# Patient Record
Sex: MALE | Race: WHITE | NOT HISPANIC OR LATINO | Employment: UNEMPLOYED | URBAN - METROPOLITAN AREA
[De-identification: names, ages, dates, MRNs, and addresses within clinical notes are randomized per-mention and may not be internally consistent; named-entity substitution may affect disease eponyms.]

---

## 2019-05-31 ENCOUNTER — APPOINTMENT (EMERGENCY)
Dept: RADIOLOGY | Facility: HOSPITAL | Age: 34
End: 2019-05-31
Attending: EMERGENCY MEDICINE
Payer: COMMERCIAL

## 2019-05-31 ENCOUNTER — HOSPITAL ENCOUNTER (EMERGENCY)
Facility: HOSPITAL | Age: 34
Discharge: HOME | End: 2019-05-31
Attending: EMERGENCY MEDICINE
Payer: COMMERCIAL

## 2019-05-31 VITALS
SYSTOLIC BLOOD PRESSURE: 152 MMHG | BODY MASS INDEX: 27.98 KG/M2 | HEART RATE: 98 BPM | DIASTOLIC BLOOD PRESSURE: 83 MMHG | WEIGHT: 225 LBS | RESPIRATION RATE: 18 BRPM | TEMPERATURE: 97.5 F | HEIGHT: 75 IN | OXYGEN SATURATION: 98 %

## 2019-05-31 DIAGNOSIS — K64.9 HEMORRHOIDS, UNSPECIFIED HEMORRHOID TYPE: ICD-10-CM

## 2019-05-31 DIAGNOSIS — K92.2 GASTROINTESTINAL HEMORRHAGE, UNSPECIFIED GASTROINTESTINAL HEMORRHAGE TYPE: ICD-10-CM

## 2019-05-31 DIAGNOSIS — R10.9 ABDOMINAL PAIN, UNSPECIFIED ABDOMINAL LOCATION: Primary | ICD-10-CM

## 2019-05-31 DIAGNOSIS — D64.9 ANEMIA, UNSPECIFIED TYPE: ICD-10-CM

## 2019-05-31 LAB
ALBUMIN SERPL-MCNC: 4.5 G/DL (ref 3.4–5)
ALP SERPL-CCNC: 54 IU/L (ref 35–126)
ALT SERPL-CCNC: 17 IU/L (ref 16–63)
ANION GAP SERPL CALC-SCNC: 9 MEQ/L (ref 3–15)
ANISOCYTOSIS BLD QL SMEAR: ABNORMAL
AST SERPL-CCNC: 16 IU/L (ref 15–41)
BASOPHILS # BLD: 0.06 K/UL (ref 0.01–0.1)
BASOPHILS NFR BLD: 1.1 %
BILIRUB SERPL-MCNC: 0.7 MG/DL (ref 0.3–1.2)
BUN SERPL-MCNC: 15 MG/DL (ref 8–20)
CALCIUM SERPL-MCNC: 9.1 MG/DL (ref 8.9–10.3)
CHLORIDE SERPL-SCNC: 108 MEQ/L (ref 98–109)
CO2 SERPL-SCNC: 22 MEQ/L (ref 22–32)
CREAT SERPL-MCNC: 1 MG/DL
DIFFERENTIAL METHOD BLD: ABNORMAL
EOSINOPHIL # BLD: 0.03 K/UL (ref 0.04–0.54)
EOSINOPHIL NFR BLD: 0.6 %
ERYTHROCYTE [DISTWIDTH] IN BLOOD BY AUTOMATED COUNT: 16.7 % (ref 11.6–14.4)
GFR SERPL CREATININE-BSD FRML MDRD: >60 ML/MIN/1.73M*2
GLUCOSE SERPL-MCNC: 101 MG/DL (ref 70–99)
HCT VFR BLDCO AUTO: 31.8 %
HGB BLD-MCNC: 9.1 G/DL
HYPOCHROMIA BLD QL SMEAR: ABNORMAL
IMM GRANULOCYTES # BLD AUTO: 0.01 K/UL (ref 0–0.08)
IMM GRANULOCYTES NFR BLD AUTO: 0.2 %
LIPASE SERPL-CCNC: 40 U/L (ref 20–51)
LYMPHOCYTES # BLD: 1.68 K/UL (ref 1.2–3.5)
LYMPHOCYTES NFR BLD: 31.1 %
MCH RBC QN AUTO: 19.7 PG (ref 28–33.2)
MCHC RBC AUTO-ENTMCNC: 28.6 G/DL (ref 32.2–36.5)
MCV RBC AUTO: 68.8 FL (ref 83–98)
MONOCYTES # BLD: 0.47 K/UL (ref 0.3–1)
MONOCYTES NFR BLD: 8.7 %
NEUTROPHILS # BLD: 3.15 K/UL (ref 1.7–7)
NEUTS SEG NFR BLD: 58.3 %
NRBC BLD-RTO: 0 %
OVALOCYTES BLD QL SMEAR: ABNORMAL
PDW BLD AUTO: 10.2 FL (ref 9.4–12.4)
PLATELET # BLD AUTO: 593 K/UL
PLATELET # BLD EST: ABNORMAL 10*3/UL
POTASSIUM SERPL-SCNC: 3.5 MEQ/L (ref 3.6–5.1)
PROT SERPL-MCNC: 7.8 G/DL (ref 6–8.2)
RBC # BLD AUTO: 4.62 M/UL (ref 4.5–5.8)
SODIUM SERPL-SCNC: 139 MEQ/L (ref 136–144)
TROPONIN I SERPL-MCNC: <0.02 NG/ML
WBC # BLD AUTO: 5.4 K/UL

## 2019-05-31 PROCEDURE — 93005 ELECTROCARDIOGRAM TRACING: CPT

## 2019-05-31 PROCEDURE — 80053 COMPREHEN METABOLIC PANEL: CPT | Performed by: EMERGENCY MEDICINE

## 2019-05-31 PROCEDURE — 99284 EMERGENCY DEPT VISIT MOD MDM: CPT | Mod: 25

## 2019-05-31 PROCEDURE — 93005 ELECTROCARDIOGRAM TRACING: CPT | Performed by: EMERGENCY MEDICINE

## 2019-05-31 PROCEDURE — 96360 HYDRATION IV INFUSION INIT: CPT | Mod: 59

## 2019-05-31 PROCEDURE — 84484 ASSAY OF TROPONIN QUANT: CPT | Performed by: EMERGENCY MEDICINE

## 2019-05-31 PROCEDURE — 63600105 HC IODINE BASED CONTRAST: Performed by: EMERGENCY MEDICINE

## 2019-05-31 PROCEDURE — 96361 HYDRATE IV INFUSION ADD-ON: CPT | Mod: 59

## 2019-05-31 PROCEDURE — 3E033GC INTRODUCTION OF OTHER THERAPEUTIC SUBSTANCE INTO PERIPHERAL VEIN, PERCUTANEOUS APPROACH: ICD-10-PCS | Performed by: EMERGENCY MEDICINE

## 2019-05-31 PROCEDURE — 36415 COLL VENOUS BLD VENIPUNCTURE: CPT

## 2019-05-31 PROCEDURE — 74177 CT ABD & PELVIS W/CONTRAST: CPT

## 2019-05-31 PROCEDURE — 85025 COMPLETE CBC W/AUTO DIFF WBC: CPT | Performed by: EMERGENCY MEDICINE

## 2019-05-31 PROCEDURE — 83690 ASSAY OF LIPASE: CPT | Performed by: EMERGENCY MEDICINE

## 2019-05-31 PROCEDURE — 25800000 HC PHARMACY IV SOLUTIONS: Performed by: EMERGENCY MEDICINE

## 2019-05-31 RX ORDER — FAMOTIDINE 40 MG/1
40 TABLET, FILM COATED ORAL 2 TIMES DAILY
Qty: 180 TABLET | Refills: 0 | Status: SHIPPED | OUTPATIENT
Start: 2019-05-31 | End: 2020-04-11 | Stop reason: HOSPADM

## 2019-05-31 RX ADMIN — IOHEXOL 125 ML: 300 INJECTION, SOLUTION INTRAVENOUS at 11:15

## 2019-05-31 RX ADMIN — SODIUM CHLORIDE 1000 ML: 9 INJECTION, SOLUTION INTRAVENOUS at 10:24

## 2019-05-31 ASSESSMENT — ENCOUNTER SYMPTOMS
BLOOD IN STOOL: 1
DIARRHEA: 1
FATIGUE: 1
VOMITING: 0
DYSURIA: 0
COLOR CHANGE: 0
ARTHRALGIAS: 0
SHORTNESS OF BREATH: 0
HEMATURIA: 0
PALPITATIONS: 0
FEVER: 0
CONSTIPATION: 1
SEIZURES: 0
EYE PAIN: 0
ABDOMINAL PAIN: 1
SORE THROAT: 0
BACK PAIN: 0
COUGH: 0
CHILLS: 0

## 2019-05-31 NOTE — DISCHARGE INSTRUCTIONS
You are being discharged from the ED. You will need to schedule an appointment with a gastroenterologist for further evaluation.

## 2019-05-31 NOTE — ED ATTESTATION NOTE
Procedures  Physical Exam  Review of Systems    5/31/201910:34 AM  I have personally seen and examined the patient.  I reviewed and agree with the PA/NP/Resident's assessment and plan of care.    My examination, assessment, and plan of care of En Gonsales is as follows:  The patient presents with fatigue, chest pain, abdominal pain and 50 pound weight loss over the last few months.  Exam: Left lower quadrant pain.  No guarding, rigidity or rebound  Impression/Plan:iv, Labs, CT, observe     I was physically present for the key/critical portions of the following procedures: None     Eligio Amaro MD  05/31/19 9492

## 2019-05-31 NOTE — ED PROVIDER NOTES
"HPI     Chief Complaint   Patient presents with   • Abdominal Pain       33yom PMHx depression, bipolar disorder  Patient presents with numerous complaints including fatigue, abdominal pain, sleep disturbance, variable bowel habits, occasional chest pain.  Symptoms have been present for 4-5 months. No recent change in severity of symptoms, however he was \"forced\" to come to ED by a family member.  No active complaints here in the ED other than fatigue. Also concerned about a recent 50 lb weight loss.               Patient History     History reviewed. No pertinent past medical history.    Past Surgical History:   Procedure Laterality Date   • ADENOIDECTOMY     • APPENDECTOMY     • FEM-POP BYPASS     • HERNIA REPAIR     • SHOULDER SURGERY     • TONSILLECTOMY         History reviewed. No pertinent family history.    Social History   Substance Use Topics   • Smoking status: Former Smoker   • Smokeless tobacco: Never Used   • Alcohol use No       Systems Reviewed from Nursing Triage:          Review of Systems     Review of Systems   Constitutional: Positive for fatigue. Negative for chills and fever.   HENT: Negative for ear pain and sore throat.    Eyes: Negative for pain and visual disturbance.   Respiratory: Negative for cough and shortness of breath.    Cardiovascular: Positive for chest pain. Negative for palpitations.   Gastrointestinal: Positive for abdominal pain, blood in stool, constipation and diarrhea. Negative for vomiting.   Genitourinary: Negative for dysuria and hematuria.   Musculoskeletal: Negative for arthralgias and back pain.   Skin: Negative for color change and rash.   Neurological: Negative for seizures and syncope.   All other systems reviewed and are negative.       Physical Exam     ED Triage Vitals [05/31/19 0955]   Temp Heart Rate Resp BP SpO2   36.8 °C (98.3 °F) (!) 107 18 (!) 143/90 97 %      Temp src Heart Rate Source Patient Position BP Location FiO2 (%) (Set)   -- -- -- -- --           " "          Patient Vitals for the past 24 hrs:   BP Temp Pulse Resp SpO2 Height Weight   05/31/19 1025 (!) 148/79 - (!) 101 18 98 % - -   05/31/19 0955 (!) 143/90 36.8 °C (98.3 °F) (!) 107 18 97 % 1.905 m (6' 3\") 102 kg (225 lb)           Physical Exam   Constitutional: He appears well-developed and well-nourished.   HENT:   Head: Normocephalic and atraumatic.   Eyes: Conjunctivae are normal.   Neck: Neck supple.   Cardiovascular: Normal rate and regular rhythm.    No murmur heard.  Pulmonary/Chest: Effort normal and breath sounds normal. No respiratory distress.   Abdominal: Soft. Normal appearance. There is no tenderness.   Genitourinary: Rectal exam shows external hemorrhoid and guaiac positive stool.   Musculoskeletal: He exhibits no edema.   Neurological: He is alert.   Skin: Skin is warm and dry.   Psychiatric: He has a normal mood and affect.   Nursing note and vitals reviewed.           Procedures    ED Course & MDM     Labs Reviewed   CBC - Abnormal        Result Value    WBC 5.40      RBC 4.62      Hemoglobin 9.1 (*)     Hematocrit 31.8 (*)     MCV 68.8 (*)     MCH 19.7 (*)     MCHC 28.6 (*)     RDW 16.7 (*)     Platelets 593 (*)     MPV 10.2     CBC AND DIFFERENTIAL    Narrative:     The following orders were created for panel order CBC and differential.  Procedure                               Abnormality         Status                     ---------                               -----------         ------                     CBC[[id number]]                           Abnormal            Final result               Diff Count[[id number]]                                        In process                   Please view results for these tests on the individual orders.   COMPREHENSIVE METABOLIC PANEL   LIPASE   URINALYSIS REFLEX CULTURE    Narrative:     The following orders were created for panel order Urinalysis with Reflex Culture.  Procedure                               Abnormality         Status                "      ---------                               -----------         ------                     UA Reflex to Culture (Mac...[[id number]]                                                   Please view results for these tests on the individual orders.   TROPONIN I   RAINBOW DRAW PANEL    Narrative:     The following orders were created for panel order [place] Draw Panel.  Procedure                               Abnormality         Status                     ---------                               -----------         ------                     RAINBOW RED[[id number]]                                       In process                 RAINBOW PINK[[id number]]                                                                 RAINBOW PINK[[id number]]                                      In process                 RAINBOW LT BLUE[[id number]]                                   In process                 RAINBOW GOLD[[id number]]                                      In process                   Please view results for these tests on the individual orders.   DIFF COUNT   UA REFLEX CULTURE (MACROSCOPIC)   RAINBOW RED   RAINBOW PINK   RAINBOW PINK   RAINBOW LT BLUE   RAINBOW GOLD       ECG 12 lead    (Results Pending)   CT ABDOMEN PELVIS WITH IV CONTRAST    (Results Pending)               MDM  Number of Diagnoses or Management Options  Abdominal pain, unspecified abdominal location:   Anemia, unspecified type:   Gastrointestinal hemorrhage, unspecified gastrointestinal hemorrhage type:   Hemorrhoids, unspecified hemorrhoid type:   Diagnosis management comments: 33yom with GI bleed, heme positive and anemia  No transfusion necessary  CT unremarkable  Dc for GI f/u             ED Course as of [place] 02 1906   Fri May 31, 2019   1056 Hemoglobin: (!) 9.1 [AB]   1232 The patient wants to go home.  We attempted to make an outpatient follow-up at the office is closed.  I will call the on-call GI doctor and have him have the office call the patient with the  time.  [SB]      ED Course User Index  [AB] Gavin Norton MD  [SB] Eligio Amaro MD         Clinical Impressions as of Jun 02 1906   Abdominal pain, unspecified abdominal location   Hemorrhoids, unspecified hemorrhoid type   Gastrointestinal hemorrhage, unspecified gastrointestinal hemorrhage type   Anemia, unspecified type        Gavin Norton MD  Resident  06/02/19 1908

## 2019-06-01 LAB
ATRIAL RATE: 101
P AXIS: 66
PR INTERVAL: 140
QRS DURATION: 94
QT INTERVAL: 346
QTC CALCULATION(BAZETT): 448
R AXIS: 79
T WAVE AXIS: 46
VENTRICULAR RATE: 101

## 2019-06-01 PROCEDURE — 93010 ELECTROCARDIOGRAM REPORT: CPT | Performed by: INTERNAL MEDICINE

## 2020-02-27 ENCOUNTER — APPOINTMENT (EMERGENCY)
Dept: RADIOLOGY | Facility: HOSPITAL | Age: 35
End: 2020-02-27
Attending: EMERGENCY MEDICINE
Payer: COMMERCIAL

## 2020-02-27 ENCOUNTER — HOSPITAL ENCOUNTER (EMERGENCY)
Facility: HOSPITAL | Age: 35
Discharge: HOME | End: 2020-02-27
Attending: EMERGENCY MEDICINE
Payer: COMMERCIAL

## 2020-02-27 VITALS
HEIGHT: 75 IN | DIASTOLIC BLOOD PRESSURE: 85 MMHG | HEART RATE: 81 BPM | BODY MASS INDEX: 27.98 KG/M2 | RESPIRATION RATE: 18 BRPM | TEMPERATURE: 98.2 F | WEIGHT: 225 LBS | OXYGEN SATURATION: 99 % | SYSTOLIC BLOOD PRESSURE: 140 MMHG

## 2020-02-27 DIAGNOSIS — A08.4 VIRAL GASTROENTERITIS: Primary | ICD-10-CM

## 2020-02-27 LAB
ALBUMIN SERPL-MCNC: 5.1 G/DL (ref 3.4–5)
ALP SERPL-CCNC: 39 IU/L (ref 35–126)
ALT SERPL-CCNC: 16 IU/L (ref 16–63)
ANION GAP SERPL CALC-SCNC: 11 MEQ/L (ref 3–15)
AST SERPL-CCNC: 21 IU/L (ref 15–41)
BACTERIA URNS QL MICRO: NORMAL /HPF
BASOPHILS # BLD: 0.06 K/UL (ref 0.01–0.1)
BASOPHILS NFR BLD: 0.7 %
BILIRUB SERPL-MCNC: 0.8 MG/DL (ref 0.3–1.2)
BILIRUB UR QL STRIP.AUTO: NEGATIVE MG/DL
BUN SERPL-MCNC: 16 MG/DL (ref 8–20)
CALCIUM SERPL-MCNC: 9.3 MG/DL (ref 8.9–10.3)
CHLORIDE SERPL-SCNC: 99 MEQ/L (ref 98–109)
CLARITY UR REFRACT.AUTO: CLEAR
CO2 SERPL-SCNC: 25 MEQ/L (ref 22–32)
COLOR UR AUTO: YELLOW
CREAT SERPL-MCNC: 1 MG/DL (ref 0.8–1.3)
DIFFERENTIAL METHOD BLD: ABNORMAL
EOSINOPHIL # BLD: 0.01 K/UL (ref 0.04–0.54)
EOSINOPHIL NFR BLD: 0.1 %
ERYTHROCYTE [DISTWIDTH] IN BLOOD BY AUTOMATED COUNT: 16 % (ref 11.6–14.4)
FLUAV RNA SPEC QL NAA+PROBE: NEGATIVE
FLUBV RNA SPEC QL NAA+PROBE: NEGATIVE
GFR SERPL CREATININE-BSD FRML MDRD: >60 ML/MIN/1.73M*2
GLUCOSE SERPL-MCNC: 117 MG/DL (ref 70–99)
GLUCOSE UR STRIP.AUTO-MCNC: NEGATIVE MG/DL
HCT VFR BLDCO AUTO: 39.2 % (ref 40.1–51)
HGB BLD-MCNC: 11.4 G/DL (ref 13.7–17.5)
HGB UR QL STRIP.AUTO: NEGATIVE
HYALINE CASTS #/AREA URNS LPF: NORMAL /LPF
IMM GRANULOCYTES # BLD AUTO: 0.03 K/UL (ref 0–0.08)
IMM GRANULOCYTES NFR BLD AUTO: 0.3 %
KETONES UR STRIP.AUTO-MCNC: 2 MG/DL
LEUKOCYTE ESTERASE UR QL STRIP.AUTO: NEGATIVE
LIPASE SERPL-CCNC: 40 U/L (ref 20–51)
LYMPHOCYTES # BLD: 1.18 K/UL (ref 1.2–3.5)
LYMPHOCYTES NFR BLD: 13 %
MCH RBC QN AUTO: 20.8 PG (ref 28–33.2)
MCHC RBC AUTO-ENTMCNC: 29.1 G/DL (ref 32.2–36.5)
MCV RBC AUTO: 71.4 FL (ref 83–98)
MONOCYTES # BLD: 0.48 K/UL (ref 0.3–1)
MONOCYTES NFR BLD: 5.3 %
NEUTROPHILS # BLD: 7.34 K/UL (ref 1.7–7)
NEUTS SEG NFR BLD: 80.6 %
NITRITE UR QL STRIP.AUTO: NEGATIVE
NRBC BLD-RTO: 0 %
PDW BLD AUTO: 10.3 FL (ref 9.4–12.4)
PH UR STRIP.AUTO: 6.5 [PH]
PLATELET # BLD AUTO: 514 K/UL (ref 150–350)
POTASSIUM SERPL-SCNC: 3.2 MEQ/L (ref 3.6–5.1)
PROT SERPL-MCNC: 8.6 G/DL (ref 6–8.2)
PROT UR QL STRIP.AUTO: ABNORMAL
RBC # BLD AUTO: 5.49 M/UL (ref 4.5–5.8)
RBC #/AREA URNS HPF: NORMAL /HPF
RSV RNA SPEC QL NAA+PROBE: NEGATIVE
SODIUM SERPL-SCNC: 135 MEQ/L (ref 136–144)
SP GR UR REFRACT.AUTO: >1.035
SQUAMOUS URNS QL MICRO: NORMAL /HPF
UROBILINOGEN UR STRIP-ACNC: 0.2 EU/DL
WBC # BLD AUTO: 9.1 K/UL (ref 3.8–10.5)
WBC #/AREA URNS HPF: NORMAL /HPF

## 2020-02-27 PROCEDURE — 3E013GC INTRODUCTION OF OTHER THERAPEUTIC SUBSTANCE INTO SUBCUTANEOUS TISSUE, PERCUTANEOUS APPROACH: ICD-10-PCS | Performed by: EMERGENCY MEDICINE

## 2020-02-27 PROCEDURE — 81001 URINALYSIS AUTO W/SCOPE: CPT | Performed by: EMERGENCY MEDICINE

## 2020-02-27 PROCEDURE — 63600000 HC DRUGS/DETAIL CODE: Performed by: PHYSICIAN ASSISTANT

## 2020-02-27 PROCEDURE — 96361 HYDRATE IV INFUSION ADD-ON: CPT | Mod: 59

## 2020-02-27 PROCEDURE — 25800000 HC PHARMACY IV SOLUTIONS: Performed by: PHYSICIAN ASSISTANT

## 2020-02-27 PROCEDURE — 83690 ASSAY OF LIPASE: CPT | Performed by: PHYSICIAN ASSISTANT

## 2020-02-27 PROCEDURE — 96374 THER/PROPH/DIAG INJ IV PUSH: CPT | Mod: 59

## 2020-02-27 PROCEDURE — 85025 COMPLETE CBC W/AUTO DIFF WBC: CPT | Performed by: EMERGENCY MEDICINE

## 2020-02-27 PROCEDURE — 80053 COMPREHEN METABOLIC PANEL: CPT | Performed by: EMERGENCY MEDICINE

## 2020-02-27 PROCEDURE — 63700000 HC SELF-ADMINISTRABLE DRUG: Performed by: EMERGENCY MEDICINE

## 2020-02-27 PROCEDURE — 63600105 HC IODINE BASED CONTRAST: Performed by: PHYSICIAN ASSISTANT

## 2020-02-27 PROCEDURE — 36415 COLL VENOUS BLD VENIPUNCTURE: CPT

## 2020-02-27 PROCEDURE — 3E0337Z INTRODUCTION OF ELECTROLYTIC AND WATER BALANCE SUBSTANCE INTO PERIPHERAL VEIN, PERCUTANEOUS APPROACH: ICD-10-PCS | Performed by: EMERGENCY MEDICINE

## 2020-02-27 PROCEDURE — 85025 COMPLETE CBC W/AUTO DIFF WBC: CPT

## 2020-02-27 PROCEDURE — 63700000 HC SELF-ADMINISTRABLE DRUG: Performed by: PHYSICIAN ASSISTANT

## 2020-02-27 PROCEDURE — 74177 CT ABD & PELVIS W/CONTRAST: CPT

## 2020-02-27 PROCEDURE — 87631 RESP VIRUS 3-5 TARGETS: CPT | Performed by: PHYSICIAN ASSISTANT

## 2020-02-27 PROCEDURE — 99284 EMERGENCY DEPT VISIT MOD MDM: CPT | Mod: 25

## 2020-02-27 RX ORDER — ONDANSETRON 4 MG/1
4 TABLET, ORALLY DISINTEGRATING ORAL EVERY 8 HOURS PRN
Qty: 12 TABLET | Refills: 0 | Status: SHIPPED | OUTPATIENT
Start: 2020-02-27 | End: 2023-09-26

## 2020-02-27 RX ORDER — DICYCLOMINE HYDROCHLORIDE 10 MG/1
CAPSULE ORAL
Status: DISCONTINUED
Start: 2020-02-27 | End: 2020-02-27 | Stop reason: HOSPADM

## 2020-02-27 RX ORDER — POTASSIUM CHLORIDE 750 MG/1
40 TABLET, FILM COATED, EXTENDED RELEASE ORAL ONCE
Status: COMPLETED | OUTPATIENT
Start: 2020-02-27 | End: 2020-02-27

## 2020-02-27 RX ORDER — DICYCLOMINE HYDROCHLORIDE 10 MG/1
20 CAPSULE ORAL 4 TIMES DAILY
Status: DISCONTINUED | OUTPATIENT
Start: 2020-02-27 | End: 2020-02-27

## 2020-02-27 RX ORDER — DICYCLOMINE HYDROCHLORIDE 20 MG/1
20 TABLET ORAL 2 TIMES DAILY
Qty: 20 TABLET | Refills: 0 | Status: SHIPPED | OUTPATIENT
Start: 2020-02-27 | End: 2023-09-26

## 2020-02-27 RX ORDER — ONDANSETRON HYDROCHLORIDE 2 MG/ML
4 INJECTION, SOLUTION INTRAVENOUS ONCE
Status: COMPLETED | OUTPATIENT
Start: 2020-02-27 | End: 2020-02-27

## 2020-02-27 RX ORDER — DICYCLOMINE HYDROCHLORIDE 10 MG/1
20 CAPSULE ORAL ONCE
Status: COMPLETED | OUTPATIENT
Start: 2020-02-27 | End: 2020-02-27

## 2020-02-27 RX ADMIN — IOHEXOL 125 ML: 300 INJECTION, SOLUTION INTRAVENOUS at 17:03

## 2020-02-27 RX ADMIN — POTASSIUM CHLORIDE 40 MEQ: 750 TABLET, EXTENDED RELEASE ORAL at 19:06

## 2020-02-27 RX ADMIN — DICYCLOMINE HYDROCHLORIDE 20 MG: 10 CAPSULE ORAL at 19:34

## 2020-02-27 RX ADMIN — SODIUM CHLORIDE 1000 ML: 9 INJECTION, SOLUTION INTRAVENOUS at 17:54

## 2020-02-27 RX ADMIN — ONDANSETRON 4 MG: 2 INJECTION INTRAMUSCULAR; INTRAVENOUS at 17:54

## 2020-02-27 ASSESSMENT — ENCOUNTER SYMPTOMS
DIZZINESS: 0
MYALGIAS: 1
ABDOMINAL PAIN: 1
VOMITING: 1
PALPITATIONS: 0
DIFFICULTY URINATING: 0
DIARRHEA: 1
COUGH: 0
CHILLS: 1
RHINORRHEA: 1
BLOOD IN STOOL: 1
DYSURIA: 0
SORE THROAT: 1
WOUND: 0
FREQUENCY: 0
FEVER: 1
HEMATURIA: 0

## 2020-02-27 NOTE — ED ATTESTATION NOTE
"-----------------------------------------------------------  ED Attending Note.  2/27/2020, 4:37 PM    I supervised care provided by the PA Ever). We have discussed the case. I have reviewed their note and agree with the plan of treatment. I personally interviewed the patient and examined the patient.    En Gonsales is a 34 y.o. male with the following   Past Medical History:   Diagnosis Date   • GERD (gastroesophageal reflux disease)    • Hypertension    , There is no problem list on file for this patient.   and   Past Surgical History:   Procedure Laterality Date   • ADENOIDECTOMY     • APPENDECTOMY     • FEM-POP BYPASS     • HERNIA REPAIR     • SHOULDER SURGERY     • TONSILLECTOMY      who comes to the ED today with   Chief Complaint   Patient presents with   • Abdominal Pain   • Vomiting       PMH as above c/o N/V/D and abdominal pain x 5 days. Diffuse crampy worse in LLQ. Non-bloody vomiting, but +bloody diarrhea. Subjective F/C. On day 4 of -pack with improvement. Sick family contacts with Flu A & B and strep.     PHYSICAL EXAM  Visit Vitals  /77 (BP Location: Right upper arm, Patient Position: Sitting)   Pulse 99   Temp 36.7 °C (98.1 °F) (Temporal)   Resp 14   Ht 1.905 m (6' 3\")   Wt 102 kg (225 lb)   SpO2 97%   BMI 28.12 kg/m²     Physical Exam   Constitutional: He is oriented to person, place, and time.   HENT:   Head: Normocephalic and atraumatic.   Cardiovascular: Normal rate, regular rhythm and normal heart sounds.   Pulmonary/Chest: Effort normal and breath sounds normal. No respiratory distress.   Abdominal: Soft. Bowel sounds are normal. There is generalized tenderness and tenderness in the right lower quadrant. There is guarding (RLQ). There is no rebound.   Neurological: He is alert and oriented to person, place, and time.       RESULTS: LABS, IMAGING, EKG  SpO2: 97 % on room air. Interpretation: normal  Labs Reviewed   COMPREHENSIVE METABOLIC PANEL - Abnormal       Result Value    " Sodium 135 (*)     Potassium 3.2 (*)     Chloride 99      CO2 25      BUN 16      Creatinine 1.0      Glucose 117 (*)     Calcium 9.3      AST (SGOT) 21      ALT (SGPT) 16      Alkaline Phosphatase 39      Total Protein 8.6 (*)     Albumin 5.1 (*)     Bilirubin, Total 0.8      eGFR >60.0      Anion Gap 11     CBC AND DIFF - Abnormal    WBC 9.10      RBC 5.49      Hemoglobin 11.4 (*)     Hematocrit 39.2 (*)     MCV 71.4 (*)     MCH 20.8 (*)     MCHC 29.1 (*)     RDW 16.0 (*)     Platelets 514 (*)     MPV 10.3      Differential Type Auto      nRBC 0.0      Immature Granulocytes 0.3      Neutrophils 80.6      Lymphocytes 13.0      Monocytes 5.3      Eosinophils 0.1      Basophils 0.7      Immature Granulocytes, Absolute 0.03      Neutrophils, Absolute 7.34 (*)     Lymphocytes, Absolute 1.18 (*)     Monocytes, Absolute 0.48      Eosinophils, Absolute 0.01 (*)     Basophils, Absolute 0.06     UA REFLEX CULTURE (MACROSCOPIC) - Abnormal    Color, Urine Yellow      Clarity, Urine Clear      Specific Gravity, Urine >1.035 (*)     pH, Urine 6.5      Leukocyte Esterase Negative      Nitrite, Urine Negative      Protein, Urine Trace (*)     Glucose, Urine Negative      Ketones, Urine +2 (*)     Urobilinogen, Urine 0.2      Bilirubin, Urine Negative      Blood, Urine Negative     RSV AND INFLUENZA A/B NUCLEIC ACIDS, PCR - Normal    Influenza A Negative      Influenza B Negative      Respiratory Syncytial Virus Negative     LIPASE - Normal    Lipase 40     UA MICROSCOPIC - Normal    RBC, Urine 0 TO 4      WBC, Urine 0 TO 3      Squamous Epithelial None Seen      Hyaline Cast None Seen      Bacteria, Urine None Seen     URINALYSIS REFLEX CULTURE (ED AND OUTPATIENT ONLY)    Narrative:     The following orders were created for panel order Urinalysis with Reflex Culture.  Procedure                               Abnormality         Status                     ---------                               -----------         ------                      UA Reflex to Culture (Mac...[[id number]]  Abnormal            Final result               UA Microscopic[[id number]]                Normal              Final result                 Please view results for these tests on the individual orders.   RAINBOW DRAW PANEL    Narrative:     The following orders were created for panel order [place] Draw Panel.  Procedure                               Abnormality         Status                     ---------                               -----------         ------                     RAINBOW RED[[id number]]                                       In process                 RAINBOW LT BLUE[[id number]]                                   In process                 RAINBOW LT GREEN[[id number]]                                  In process                 RAINBOW GOLD[[id number]]                                      In process                   Please view results for these tests on the individual orders.   RAINBOW RED   RAINBOW LT BLUE   RAINBOW LT GREEN   RAINBOW GOLD     CT ABDOMEN PELVIS WITH IV CONTRAST   Final Result   IMPRESSION: No acute infectious or inflammatory changes in the abdomen or   pelvis.  Evaluation of the bowel is limited without intraluminal contrast.  No   pericolonic inflammatory stranding or bowel obstruction.      I certify that I have personally reviewed this examination and agree with this   report.   Yesenia Faye, DO          ED Course as of Feb 27 2019   Thu Feb 27, 2020   1837 Impression: Abdominal pain, nausea, vomiting, diarrhea, subjective fever chills for 5 daysPlan: Labs, fluids, urine, flu test at family request, CAT scan abdomen pelvis due to left lower quadrant pain    [CO]   1916 Blood work showing very mild anemia, slight hypokalemia, otherwise grossly unremarkable, urine not consistent with UTI, flu test negative, lipase within normal limits, CAT scan showing no acute, emergent, traumatic, or infectious cause for patient's symptoms, suspect viral  MD lo to see    [CO]      ED Course User Index  [CO] Susan Galvez, TAMELA PRAJAPATI         Clinical Impressions as of Feb 27 2019   Viral gastroenteritis       -----------------------------  Mariangel Caraballo MD  2/27/2020, 4:37 PM  -----------------------------------------------------------     Mariangel Caraballo MD  03/01/20 2012

## 2020-02-27 NOTE — ED PROVIDER NOTES
HPI     Chief Complaint   Patient presents with   • Abdominal Pain   • Vomiting         History provided by:  Patient   used: No         34 y.o. male with hx of HTN and GERD s/p appendectomy presents to ED for evaluation of abdominal pain and vomiting x 5 days PTA. He describes pain in his LLQ since 2/22 with associated vomiting 6+ times, myalgias, diarrhea 3-4x daily with bright red blood, subjective fever, chills, rhinorrhea, and sore throat. He took Zofran 4mg 1 hour PTA. He has not been taking his famotidine because he believes it made his sx worse. He is on day 4 of a z-pack treatment with no relief. He denies dysuria, hematuria, frequency, enuresis, and cough. He reports hx of hemorrhoids. He reports sick contact with children with flu A and B and streptococcal pharyngitis. He admits to family hx of diverticulosis.       Patient History     Past Medical History:   Diagnosis Date   • GERD (gastroesophageal reflux disease)    • Hypertension        Past Surgical History:   Procedure Laterality Date   • ADENOIDECTOMY     • APPENDECTOMY     • FEM-POP BYPASS     • HERNIA REPAIR     • SHOULDER SURGERY     • TONSILLECTOMY         History reviewed. No pertinent family history.    Social History     Tobacco Use   • Smoking status: Former Smoker   • Smokeless tobacco: Never Used   Substance Use Topics   • Alcohol use: Yes     Comment: Very rare   • Drug use: Yes     Types: Marijuana       Systems Reviewed from Nursing Triage:          Review of Systems     Review of Systems   Constitutional: Positive for chills and fever.   HENT: Positive for rhinorrhea and sore throat. Negative for congestion.    Eyes: Negative for visual disturbance.   Respiratory: Negative for cough.    Cardiovascular: Negative for palpitations.   Gastrointestinal: Positive for abdominal pain, blood in stool, diarrhea and vomiting.   Genitourinary: Negative for difficulty urinating, dysuria, enuresis, frequency, hematuria and  "urgency.   Musculoskeletal: Positive for myalgias (generalized).   Skin: Negative for wound.   Neurological: Negative for dizziness and syncope.        Physical Exam     ED Triage Vitals [02/27/20 1517]   Temp Heart Rate Resp BP SpO2   36.7 °C (98.1 °F) 99 14 121/77 97 %      Temp Source Heart Rate Source Patient Position BP Location FiO2 (%) (Set)   Temporal Monitor Sitting Right upper arm --                     Patient Vitals for the past 24 hrs:   BP Temp Temp src Pulse Resp SpO2 Height Weight   02/27/20 1517 121/77 36.7 °C (98.1 °F) Temporal 99 14 97 % 1.905 m (6' 3\") 102 kg (225 lb)                                       Physical Exam   Constitutional: He is oriented to person, place, and time. He appears well-developed and well-nourished.   HENT:   Head: Normocephalic and atraumatic.   Mouth/Throat: Posterior oropharyngeal erythema present.   Eyes: Conjunctivae are normal.   Neck: Neck supple.   Cardiovascular: Normal rate and regular rhythm.   Pulmonary/Chest: Effort normal and breath sounds normal.   Abdominal: Soft. There is tenderness in the left lower quadrant.   Musculoskeletal: He exhibits no deformity.   Neurological: He is alert and oriented to person, place, and time.   Skin: Skin is warm and dry.   Psychiatric: He has a normal mood and affect. His behavior is normal.   Nursing note and vitals reviewed.           Procedures    ED Course & MDM     Labs Reviewed   COMPREHENSIVE METABOLIC PANEL   CBC AND DIFF   URINALYSIS REFLEX CULTURE (ED AND OUTPATIENT ONLY)    Narrative:     The following orders were created for panel order Urinalysis with Reflex Culture.  Procedure                               Abnormality         Status                     ---------                               -----------         ------                     UA Reflex to Culture (Mac...[[id number]]                                                   Please view results for these tests on the individual orders.   RAINBOW DRAW PANEL    " Narrative:     The following orders were created for panel order [place] Draw Panel.  Procedure                               Abnormality         Status                     ---------                               -----------         ------                     RAINBOW RED[[id number]]                                                                  RAINBOW LT BLUE[[id number]]                                                              RAINBOW LT GREEN[[id number]]                                                             RAINBOW GOLD[[id number]]                                                                   Please view results for these tests on the individual orders.   UA REFLEX CULTURE (MACROSCOPIC)   LIPASE   RAINBOW RED   RAINBOW LT BLUE   RAINBOW LT GREEN   RAINBOW GOLD       No orders to display               MDM         ED Course as of Feb 28 0122   Thu Feb 27, 2020   1837 Impression: Abdominal pain, nausea, vomiting, diarrhea, subjective fever chills for 5 daysPlan: Labs, fluids, urine, flu test at family request, CAT scan abdomen pelvis due to left lower quadrant pain    [CO]   1916 Blood work showing very mild anemia, slight hypokalemia, otherwise grossly unremarkable, urine not consistent with UTI, flu test negative, lipase within normal limits, CAT scan showing no acute, emergent, traumatic, or infectious cause for patient's symptoms, suspect viral gastroenteritis, MD to see    [CO]      ED Course User Index  [CO] Susan Galvez PA C         Clinical Impressions as of Feb 28 0122   Viral gastroenteritis        By signing my name below, I, Dae Dariel, attest that this documentation has been prepared under the direction and in the presence of Susan Renee PA-C.    2/27/2020 4:16 PM    Susan ALBERTS. Zohaib Handy, personally performed the services described in this documentation, as documented by the scribe in my presence, and it is both accurate and complete.  2/28/2020 1:26  Dae Sutherland  02/27/20 1628       Susan Galvez PA C  02/28/20 0120

## 2020-02-28 NOTE — DISCHARGE INSTRUCTIONS
You were seen in the emergency room for abdominal pain, nausea, vomiting, and diarrhea for several days.  You were seen and assessed by the PA and the attending physician.  Your blood work showed very slight anemia and slightly low potassium but was otherwise unremarkable.  Your flu test was negative.  Your urine showed no signs of urinary infection.  Your CAT scan showed no signs of any appendicitis, diverticulitis, or any other acute, emergent, or infectious cause of your symptoms.  We believe this to be a viral stomach bug or gastroenteritis.  Please rest, drink plenty of fluids, stick to a bland diet.  You can use the nausea medicine as needed for vomiting and over-the-counter Imodium as needed for diarrhea.  Please follow-up with your PCP in 2 to 3 days and return to the ED with any new or worsening symptoms or any other concerns.

## 2020-04-08 ENCOUNTER — APPOINTMENT (EMERGENCY)
Dept: RADIOLOGY | Facility: HOSPITAL | Age: 35
End: 2020-04-08
Payer: COMMERCIAL

## 2020-04-08 ENCOUNTER — HOSPITAL ENCOUNTER (OUTPATIENT)
Facility: HOSPITAL | Age: 35
Discharge: HOME | End: 2020-04-11
Attending: EMERGENCY MEDICINE | Admitting: HOSPITALIST
Payer: COMMERCIAL

## 2020-04-08 DIAGNOSIS — R10.84 GENERALIZED ABDOMINAL PAIN: Primary | ICD-10-CM

## 2020-04-08 DIAGNOSIS — R19.7 BLOODY DIARRHEA: ICD-10-CM

## 2020-04-08 DIAGNOSIS — R11.2 NON-INTRACTABLE VOMITING WITH NAUSEA, UNSPECIFIED VOMITING TYPE: ICD-10-CM

## 2020-04-08 DIAGNOSIS — R05.9 COUGH: ICD-10-CM

## 2020-04-08 DIAGNOSIS — K92.2 LOWER GI BLEED: ICD-10-CM

## 2020-04-08 PROBLEM — R10.9 INTRACTABLE ABDOMINAL PAIN: Status: ACTIVE | Noted: 2020-04-08

## 2020-04-08 LAB
ALBUMIN SERPL-MCNC: 5.4 G/DL (ref 3.4–5)
ALP SERPL-CCNC: 59 IU/L (ref 35–126)
ALT SERPL-CCNC: 16 IU/L (ref 16–63)
ANION GAP SERPL CALC-SCNC: 16 MEQ/L (ref 3–15)
ANISOCYTOSIS BLD QL SMEAR: ABNORMAL
AST SERPL-CCNC: 19 IU/L (ref 15–41)
BASOPHILS # BLD: 0 K/UL (ref 0.01–0.1)
BASOPHILS NFR BLD: 0 %
BILIRUB SERPL-MCNC: 1.1 MG/DL (ref 0.3–1.2)
BILIRUB UR QL STRIP.AUTO: NEGATIVE MG/DL
BUN SERPL-MCNC: 19 MG/DL (ref 8–20)
CALCIUM SERPL-MCNC: 9.9 MG/DL (ref 8.9–10.3)
CHLORIDE SERPL-SCNC: 103 MEQ/L (ref 98–109)
CLARITY UR REFRACT.AUTO: CLEAR
CO2 SERPL-SCNC: 17 MEQ/L (ref 22–32)
COLOR UR AUTO: YELLOW
CREAT SERPL-MCNC: 1.1 MG/DL (ref 0.8–1.3)
DIFFERENTIAL METHOD BLD: ABNORMAL
EOSINOPHIL # BLD: 0 K/UL (ref 0.04–0.54)
EOSINOPHIL NFR BLD: 0 %
ERYTHROCYTE [DISTWIDTH] IN BLOOD BY AUTOMATED COUNT: 17.2 % (ref 11.6–14.4)
GFR SERPL CREATININE-BSD FRML MDRD: >60 ML/MIN/1.73M*2
GIANT PLATELETS BLD QL SMEAR: ABNORMAL
GLUCOSE SERPL-MCNC: 144 MG/DL (ref 70–99)
GLUCOSE UR STRIP.AUTO-MCNC: NEGATIVE MG/DL
HCT VFR BLDCO AUTO: 41 % (ref 40.1–51)
HGB BLD-MCNC: 12.1 G/DL (ref 13.7–17.5)
HGB UR QL STRIP.AUTO: NEGATIVE
HYPOCHROMIA BLD QL SMEAR: ABNORMAL
KETONES UR STRIP.AUTO-MCNC: 1 MG/DL
LACTATE SERPL-SCNC: 1.5 MMOL/L (ref 0.4–2)
LEUKOCYTE ESTERASE UR QL STRIP.AUTO: NEGATIVE
LYMPHOCYTES # BLD: 0 K/UL (ref 1.2–3.5)
LYMPHOCYTES NFR BLD: 0 %
MCH RBC QN AUTO: 20.6 PG (ref 28–33.2)
MCHC RBC AUTO-ENTMCNC: 29.5 G/DL (ref 32.2–36.5)
MCV RBC AUTO: 69.8 FL (ref 83–98)
MONOCYTES # BLD: 0 K/UL (ref 0.3–1)
MONOCYTES NFR BLD: 0 %
NEUTS BAND # BLD: 1.03 K/UL (ref 0–0.53)
NEUTS BAND # BLD: 24.61 K/UL (ref 1.7–7)
NEUTS BAND NFR BLD: 4 %
NEUTS SEG NFR BLD: 96 %
NITRITE UR QL STRIP.AUTO: NEGATIVE
OVALOCYTES BLD QL SMEAR: ABNORMAL
PDW BLD AUTO: 10.8 FL (ref 9.4–12.4)
PH UR STRIP.AUTO: 5.5 [PH]
PLATELET # BLD AUTO: 599 K/UL (ref 150–350)
PLATELET # BLD EST: ABNORMAL 10*3/UL
POIKILOCYTOSIS BLD QL SMEAR: ABNORMAL
POLYCHROMASIA BLD QL SMEAR: ABNORMAL
POTASSIUM SERPL-SCNC: 3.5 MEQ/L (ref 3.6–5.1)
PROT SERPL-MCNC: 9 G/DL (ref 6–8.2)
PROT UR QL STRIP.AUTO: NEGATIVE
RBC # BLD AUTO: 5.87 M/UL (ref 4.5–5.8)
SCHISTOCYTES BLD QL SMEAR: ABNORMAL
SMUDGE CELLS BLD QL SMEAR: ABNORMAL
SODIUM SERPL-SCNC: 136 MEQ/L (ref 136–144)
SP GR UR REFRACT.AUTO: <=1.005
TROPONIN I SERPL-MCNC: <0.02 NG/ML
UROBILINOGEN UR STRIP-ACNC: 0.2 EU/DL
WBC # BLD AUTO: 25.64 K/UL (ref 3.8–10.5)

## 2020-04-08 PROCEDURE — 63600105 HC IODINE BASED CONTRAST: Performed by: PHYSICIAN ASSISTANT

## 2020-04-08 PROCEDURE — 93005 ELECTROCARDIOGRAM TRACING: CPT | Performed by: PHYSICIAN ASSISTANT

## 2020-04-08 PROCEDURE — 63600000 HC DRUGS/DETAIL CODE: Performed by: PHYSICIAN ASSISTANT

## 2020-04-08 PROCEDURE — 83605 ASSAY OF LACTIC ACID: CPT | Performed by: PHYSICIAN ASSISTANT

## 2020-04-08 PROCEDURE — 82010 KETONE BODYS QUAN: CPT | Performed by: PHYSICIAN ASSISTANT

## 2020-04-08 PROCEDURE — 84100 ASSAY OF PHOSPHORUS: CPT | Performed by: PHYSICIAN ASSISTANT

## 2020-04-08 PROCEDURE — 85025 COMPLETE CBC W/AUTO DIFF WBC: CPT | Performed by: PHYSICIAN ASSISTANT

## 2020-04-08 PROCEDURE — 74177 CT ABD & PELVIS W/CONTRAST: CPT

## 2020-04-08 PROCEDURE — 83735 ASSAY OF MAGNESIUM: CPT | Performed by: PHYSICIAN ASSISTANT

## 2020-04-08 PROCEDURE — 36415 COLL VENOUS BLD VENIPUNCTURE: CPT | Performed by: PHYSICIAN ASSISTANT

## 2020-04-08 PROCEDURE — 84484 ASSAY OF TROPONIN QUANT: CPT | Performed by: PHYSICIAN ASSISTANT

## 2020-04-08 PROCEDURE — U0002 COVID-19 LAB TEST NON-CDC: HCPCS | Performed by: PHYSICIAN ASSISTANT

## 2020-04-08 PROCEDURE — 71045 X-RAY EXAM CHEST 1 VIEW: CPT

## 2020-04-08 PROCEDURE — 81003 URINALYSIS AUTO W/O SCOPE: CPT | Performed by: PHYSICIAN ASSISTANT

## 2020-04-08 PROCEDURE — 80053 COMPREHEN METABOLIC PANEL: CPT | Performed by: PHYSICIAN ASSISTANT

## 2020-04-08 PROCEDURE — 25800000 HC PHARMACY IV SOLUTIONS: Performed by: PHYSICIAN ASSISTANT

## 2020-04-08 PROCEDURE — 96374 THER/PROPH/DIAG INJ IV PUSH: CPT | Mod: 59

## 2020-04-08 PROCEDURE — 21400000 HC ROOM AND CARE CCU/INTERMEDIATE

## 2020-04-08 PROCEDURE — 86140 C-REACTIVE PROTEIN: CPT | Performed by: HOSPITALIST

## 2020-04-08 PROCEDURE — 99285 EMERGENCY DEPT VISIT HI MDM: CPT | Mod: 25

## 2020-04-08 PROCEDURE — 87040 BLOOD CULTURE FOR BACTERIA: CPT | Performed by: PHYSICIAN ASSISTANT

## 2020-04-08 RX ORDER — MORPHINE SULFATE 4 MG/ML
4 INJECTION, SOLUTION INTRAMUSCULAR; INTRAVENOUS ONCE
Status: COMPLETED | OUTPATIENT
Start: 2020-04-08 | End: 2020-04-08

## 2020-04-08 RX ORDER — LISINOPRIL 10 MG/1
10 TABLET ORAL
COMMUNITY
Start: 2020-02-24 | End: 2023-09-26

## 2020-04-08 RX ORDER — VANCOMYCIN/0.9 % SOD CHLORIDE 1.5G/250ML
15 PLASTIC BAG, INJECTION (ML) INTRAVENOUS ONCE
Status: COMPLETED | OUTPATIENT
Start: 2020-04-08 | End: 2020-04-09

## 2020-04-08 RX ORDER — ONDANSETRON HYDROCHLORIDE 2 MG/ML
4 INJECTION, SOLUTION INTRAVENOUS ONCE
Status: COMPLETED | OUTPATIENT
Start: 2020-04-08 | End: 2020-04-08

## 2020-04-08 RX ADMIN — SODIUM CHLORIDE 1000 ML: 9 INJECTION, SOLUTION INTRAVENOUS at 21:16

## 2020-04-08 RX ADMIN — IOHEXOL 125 ML: 300 INJECTION, SOLUTION INTRAVENOUS at 22:02

## 2020-04-08 RX ADMIN — MORPHINE SULFATE 4 MG: 4 INJECTION, SOLUTION INTRAMUSCULAR; INTRAVENOUS at 21:19

## 2020-04-08 RX ADMIN — ONDANSETRON 4 MG: 2 INJECTION INTRAMUSCULAR; INTRAVENOUS at 21:19

## 2020-04-08 RX ADMIN — PIPERACILLIN AND TAZOBACTAM 3.38 G: 3; .375 INJECTION, POWDER, LYOPHILIZED, FOR SOLUTION INTRAVENOUS; PARENTERAL at 23:15

## 2020-04-08 RX ADMIN — VANCOMYCIN HYDROCHLORIDE 1500 MG: 1 INJECTION, POWDER, LYOPHILIZED, FOR SOLUTION INTRAVENOUS at 23:17

## 2020-04-09 PROBLEM — E87.29 METABOLIC ACIDOSIS, INCREASED ANION GAP (IAG): Status: ACTIVE | Noted: 2020-04-09

## 2020-04-09 PROBLEM — I10 HYPERTENSION: Status: ACTIVE | Noted: 2020-04-09

## 2020-04-09 PROBLEM — E83.42 HYPOMAGNESEMIA: Status: ACTIVE | Noted: 2020-04-09

## 2020-04-09 PROBLEM — D75.839 THROMBOCYTOSIS: Status: ACTIVE | Noted: 2020-04-09

## 2020-04-09 PROBLEM — R19.7 BLOODY DIARRHEA: Status: ACTIVE | Noted: 2020-04-08

## 2020-04-09 PROBLEM — E87.6 HYPOKALEMIA: Status: ACTIVE | Noted: 2020-04-09

## 2020-04-09 PROBLEM — D64.9 ANEMIA: Status: ACTIVE | Noted: 2020-04-09

## 2020-04-09 LAB
ALBUMIN SERPL-MCNC: 4.5 G/DL (ref 3.4–5)
ALP SERPL-CCNC: 44 IU/L (ref 35–126)
ALT SERPL-CCNC: 13 IU/L (ref 16–63)
ANION GAP SERPL CALC-SCNC: 9 MEQ/L (ref 3–15)
AST SERPL-CCNC: 17 IU/L (ref 15–41)
ATRIAL RATE: 104
B-OH-BUTYR SERPL-SCNC: 0.84 MMOL/L
BASOPHILS # BLD: 0.03 K/UL (ref 0.01–0.1)
BASOPHILS NFR BLD: 0.1 %
BILIRUB DIRECT SERPL-MCNC: 0.2 MG/DL
BILIRUB SERPL-MCNC: 0.9 MG/DL (ref 0.3–1.2)
BUN SERPL-MCNC: 15 MG/DL (ref 8–20)
C DIFF STL QL: NEGATIVE
CALCIUM SERPL-MCNC: 9.2 MG/DL (ref 8.9–10.3)
CHLORIDE SERPL-SCNC: 107 MEQ/L (ref 98–109)
CO2 SERPL-SCNC: 20 MEQ/L (ref 22–32)
CREAT SERPL-MCNC: 0.9 MG/DL (ref 0.8–1.3)
CRP SERPL-MCNC: 7.8 MG/L
CRP SERPL-MCNC: <6 MG/L
DIFFERENTIAL METHOD BLD: ABNORMAL
EOSINOPHIL # BLD: 0.04 K/UL (ref 0.04–0.54)
EOSINOPHIL NFR BLD: 0.2 %
ERYTHROCYTE [DISTWIDTH] IN BLOOD BY AUTOMATED COUNT: 16.4 % (ref 11.6–14.4)
GFR SERPL CREATININE-BSD FRML MDRD: >60 ML/MIN/1.73M*2
GLUCOSE SERPL-MCNC: 118 MG/DL (ref 70–99)
HCT VFR BLDCO AUTO: 36.7 % (ref 40.1–51)
HGB BLD-MCNC: 10.7 G/DL (ref 13.7–17.5)
IMM GRANULOCYTES # BLD AUTO: 0.09 K/UL (ref 0–0.08)
IMM GRANULOCYTES NFR BLD AUTO: 0.4 %
LYMPHOCYTES # BLD: 1.26 K/UL (ref 1.2–3.5)
LYMPHOCYTES NFR BLD: 6.2 %
MAGNESIUM SERPL-MCNC: 1.8 MG/DL (ref 1.8–2.5)
MAGNESIUM SERPL-MCNC: 2 MG/DL (ref 1.8–2.5)
MCH RBC QN AUTO: 20.9 PG (ref 28–33.2)
MCHC RBC AUTO-ENTMCNC: 29.2 G/DL (ref 32.2–36.5)
MCV RBC AUTO: 71.5 FL (ref 83–98)
MONOCYTES # BLD: 1.1 K/UL (ref 0.3–1)
MONOCYTES NFR BLD: 5.4 %
NEUTROPHILS # BLD: 17.73 K/UL (ref 1.7–7)
NEUTS SEG NFR BLD: 87.7 %
NRBC BLD-RTO: 0 %
P AXIS: 51
PDW BLD AUTO: 11.1 FL (ref 9.4–12.4)
PHOSPHATE SERPL-MCNC: 2.9 MG/DL (ref 2.4–4.7)
PLATELET # BLD AUTO: 555 K/UL (ref 150–350)
POTASSIUM SERPL-SCNC: 4.2 MEQ/L (ref 3.6–5.1)
PR INTERVAL: 132
PROT SERPL-MCNC: 7.3 G/DL (ref 6–8.2)
QRS DURATION: 88
QT INTERVAL: 338
QTC CALCULATION(BAZETT): 444
R AXIS: 84
RBC # BLD AUTO: 5.13 M/UL (ref 4.5–5.8)
SARS-COV-2 RNA RESP QL NAA+PROBE: NEGATIVE
SODIUM SERPL-SCNC: 136 MEQ/L (ref 136–144)
T WAVE AXIS: 25
VENTRICULAR RATE: 104
WBC # BLD AUTO: 20.25 K/UL (ref 3.8–10.5)

## 2020-04-09 PROCEDURE — 25800000 HC PHARMACY IV SOLUTIONS: Performed by: HOSPITALIST

## 2020-04-09 PROCEDURE — 83735 ASSAY OF MAGNESIUM: CPT | Performed by: HOSPITALIST

## 2020-04-09 PROCEDURE — 99223 1ST HOSP IP/OBS HIGH 75: CPT | Performed by: HOSPITALIST

## 2020-04-09 PROCEDURE — 87077 CULTURE AEROBIC IDENTIFY: CPT | Performed by: PHYSICIAN ASSISTANT

## 2020-04-09 PROCEDURE — 25000000 HC PHARMACY GENERAL: Performed by: HOSPITALIST

## 2020-04-09 PROCEDURE — 99232 SBSQ HOSP IP/OBS MODERATE 35: CPT | Performed by: INTERNAL MEDICINE

## 2020-04-09 PROCEDURE — 87324 CLOSTRIDIUM AG IA: CPT | Performed by: PHYSICIAN ASSISTANT

## 2020-04-09 PROCEDURE — 85025 COMPLETE CBC W/AUTO DIFF WBC: CPT | Performed by: HOSPITALIST

## 2020-04-09 PROCEDURE — 36415 COLL VENOUS BLD VENIPUNCTURE: CPT | Performed by: HOSPITALIST

## 2020-04-09 PROCEDURE — 63600000 HC DRUGS/DETAIL CODE: Performed by: HOSPITALIST

## 2020-04-09 PROCEDURE — 63600000 HC DRUGS/DETAIL CODE: Performed by: INTERNAL MEDICINE

## 2020-04-09 PROCEDURE — 87177 OVA AND PARASITES SMEARS: CPT | Performed by: PHYSICIAN ASSISTANT

## 2020-04-09 PROCEDURE — 86140 C-REACTIVE PROTEIN: CPT | Performed by: HOSPITALIST

## 2020-04-09 PROCEDURE — 21400000 HC ROOM AND CARE CCU/INTERMEDIATE

## 2020-04-09 PROCEDURE — 63700000 HC SELF-ADMINISTRABLE DRUG: Performed by: INTERNAL MEDICINE

## 2020-04-09 PROCEDURE — 80053 COMPREHEN METABOLIC PANEL: CPT | Performed by: HOSPITALIST

## 2020-04-09 RX ORDER — POLYETHYLENE GLYCOL 3350 17 G/17G
238 POWDER, FOR SOLUTION ORAL ONCE
Status: COMPLETED | OUTPATIENT
Start: 2020-04-09 | End: 2020-04-09

## 2020-04-09 RX ORDER — IBUPROFEN 200 MG
16-32 TABLET ORAL AS NEEDED
Status: DISCONTINUED | OUTPATIENT
Start: 2020-04-09 | End: 2020-04-11 | Stop reason: HOSPADM

## 2020-04-09 RX ORDER — POTASSIUM CHLORIDE 14.9 MG/ML
20 INJECTION INTRAVENOUS AS NEEDED
Status: DISCONTINUED | OUTPATIENT
Start: 2020-04-09 | End: 2020-04-11 | Stop reason: HOSPADM

## 2020-04-09 RX ORDER — ACETAMINOPHEN 325 MG/1
650 TABLET ORAL EVERY 4 HOURS PRN
Status: DISCONTINUED | OUTPATIENT
Start: 2020-04-09 | End: 2020-04-11 | Stop reason: HOSPADM

## 2020-04-09 RX ORDER — BISACODYL 5 MG
10 TABLET, DELAYED RELEASE (ENTERIC COATED) ORAL
Status: COMPLETED | OUTPATIENT
Start: 2020-04-09 | End: 2020-04-09

## 2020-04-09 RX ORDER — MORPHINE SULFATE 2 MG/ML
2 INJECTION, SOLUTION INTRAMUSCULAR; INTRAVENOUS
Status: DISCONTINUED | OUTPATIENT
Start: 2020-04-09 | End: 2020-04-09 | Stop reason: SDUPTHER

## 2020-04-09 RX ORDER — PANTOPRAZOLE SODIUM 40 MG/10ML
40 INJECTION, POWDER, LYOPHILIZED, FOR SOLUTION INTRAVENOUS EVERY 12 HOURS
Status: DISCONTINUED | OUTPATIENT
Start: 2020-04-09 | End: 2020-04-11 | Stop reason: HOSPADM

## 2020-04-09 RX ORDER — POTASSIUM CHLORIDE 750 MG/1
20 TABLET, FILM COATED, EXTENDED RELEASE ORAL AS NEEDED
Status: DISCONTINUED | OUTPATIENT
Start: 2020-04-09 | End: 2020-04-11 | Stop reason: HOSPADM

## 2020-04-09 RX ORDER — DEXTROSE 40 %
15-30 GEL (GRAM) ORAL AS NEEDED
Status: DISCONTINUED | OUTPATIENT
Start: 2020-04-09 | End: 2020-04-11 | Stop reason: HOSPADM

## 2020-04-09 RX ORDER — MORPHINE SULFATE 4 MG/ML
4 INJECTION, SOLUTION INTRAMUSCULAR; INTRAVENOUS
Status: DISCONTINUED | OUTPATIENT
Start: 2020-04-09 | End: 2020-04-11 | Stop reason: HOSPADM

## 2020-04-09 RX ORDER — DEXTROSE 50 % IN WATER (D50W) INTRAVENOUS SYRINGE
25 AS NEEDED
Status: DISCONTINUED | OUTPATIENT
Start: 2020-04-09 | End: 2020-04-11 | Stop reason: HOSPADM

## 2020-04-09 RX ORDER — POTASSIUM CHLORIDE 1.5 G/1.58G
40 POWDER, FOR SOLUTION ORAL AS NEEDED
Status: DISCONTINUED | OUTPATIENT
Start: 2020-04-09 | End: 2020-04-11 | Stop reason: HOSPADM

## 2020-04-09 RX ORDER — POTASSIUM CHLORIDE 750 MG/1
40 TABLET, FILM COATED, EXTENDED RELEASE ORAL AS NEEDED
Status: DISCONTINUED | OUTPATIENT
Start: 2020-04-09 | End: 2020-04-11 | Stop reason: HOSPADM

## 2020-04-09 RX ORDER — POTASSIUM CHLORIDE 1.5 G/1.58G
20 POWDER, FOR SOLUTION ORAL AS NEEDED
Status: DISCONTINUED | OUTPATIENT
Start: 2020-04-09 | End: 2020-04-11 | Stop reason: HOSPADM

## 2020-04-09 RX ORDER — ONDANSETRON HYDROCHLORIDE 2 MG/ML
4 INJECTION, SOLUTION INTRAVENOUS EVERY 8 HOURS PRN
Status: DISCONTINUED | OUTPATIENT
Start: 2020-04-09 | End: 2020-04-11 | Stop reason: HOSPADM

## 2020-04-09 RX ORDER — SODIUM CHLORIDE 9 MG/ML
INJECTION, SOLUTION INTRAVENOUS CONTINUOUS
Status: DISCONTINUED | OUTPATIENT
Start: 2020-04-09 | End: 2020-04-11 | Stop reason: HOSPADM

## 2020-04-09 RX ADMIN — SODIUM CHLORIDE: 9 INJECTION, SOLUTION INTRAVENOUS at 23:40

## 2020-04-09 RX ADMIN — PANTOPRAZOLE SODIUM 40 MG: 40 INJECTION, POWDER, LYOPHILIZED, FOR SOLUTION INTRAVENOUS at 11:20

## 2020-04-09 RX ADMIN — BISACODYL 10 MG: 5 TABLET, COATED ORAL at 23:39

## 2020-04-09 RX ADMIN — POLYETHYLENE GLYCOL 3350 238 G: 17 POWDER, FOR SOLUTION ORAL at 20:08

## 2020-04-09 RX ADMIN — MAGNESIUM SULFATE 2 G: 2 INJECTION INTRAVENOUS at 13:21

## 2020-04-09 RX ADMIN — BISACODYL 10 MG: 5 TABLET, COATED ORAL at 20:06

## 2020-04-09 RX ADMIN — SODIUM CHLORIDE: 9 INJECTION, SOLUTION INTRAVENOUS at 11:20

## 2020-04-09 RX ADMIN — SODIUM CHLORIDE: 9 INJECTION, SOLUTION INTRAVENOUS at 05:22

## 2020-04-09 RX ADMIN — PIPERACILLIN AND TAZOBACTAM 3.38 G: 3; .375 INJECTION, POWDER, LYOPHILIZED, FOR SOLUTION INTRAVENOUS; PARENTERAL at 11:21

## 2020-04-09 RX ADMIN — PANTOPRAZOLE SODIUM 40 MG: 40 INJECTION, POWDER, LYOPHILIZED, FOR SOLUTION INTRAVENOUS at 01:30

## 2020-04-09 RX ADMIN — PANTOPRAZOLE SODIUM 40 MG: 40 INJECTION, POWDER, LYOPHILIZED, FOR SOLUTION INTRAVENOUS at 23:39

## 2020-04-09 RX ADMIN — SODIUM CHLORIDE: 9 INJECTION, SOLUTION INTRAVENOUS at 01:30

## 2020-04-09 SDOH — HEALTH STABILITY: MENTAL HEALTH: HOW OFTEN DO YOU HAVE A DRINK CONTAINING ALCOHOL?: MONTHLY OR LESS

## 2020-04-09 ASSESSMENT — ENCOUNTER SYMPTOMS
SHORTNESS OF BREATH: 1
LIGHT-HEADEDNESS: 1
CHILLS: 1
WEAKNESS: 0
DYSURIA: 0
BLOOD IN STOOL: 1
FATIGUE: 1
NAUSEA: 1
PHOTOPHOBIA: 0
VOMITING: 0
FREQUENCY: 0
BACK PAIN: 0
DIARRHEA: 1
NUMBNESS: 0
WOUND: 0
COLOR CHANGE: 0
PALPITATIONS: 0
DIZZINESS: 0
NECK PAIN: 0
FEVER: 1
ABDOMINAL PAIN: 1
RHINORRHEA: 1
SORE THROAT: 0
COUGH: 1
HEADACHES: 0
DIFFICULTY URINATING: 0

## 2020-04-09 NOTE — ASSESSMENT & PLAN NOTE
Hgb range 9.1-11.4 in the past 1 year   Today Hgb 12.1, perhaps heme-concentrated   Monitor daily labs

## 2020-04-09 NOTE — CONSULTS
Infectious Disease Consult Note    Patient Name: En Gonsales  MR#: [id number]  : 1985  Admission Date: 2020  Consult Date: 20 11:33 AM   Consultant: Ciro De La Vega MD    Reason for Consult: abd pain  Referring Provider: Elsie Callahan       History of Present Illness     En Gonsales is a 34 y.o. male with PMH of GERD, HTN who was admitted on 2020 with abdominal pain and diarrhea which started 6 months ago and persisting since then with weight loss and vomiting, and myalgias. He had fever but denies any significant fever, cough, dyspnea, chest pain, dysuria, or skin rashes. On admission, he was afebrile with WBC count of 20K. CT A/P showed findings questionable for stricture of a loop of mid/distal small bowel. He was started on pip-tazo. C diff screen is negative and stool cx is in process.     Outside records were reviewed.    Allergies:   Allergies   Allergen Reactions   • Benadryl [Diphenhydramine Hcl]        Medical History:   Past Medical History:   Diagnosis Date   • GERD (gastroesophageal reflux disease)    • Hypertension    • Migraine        Surgical History:   Past Surgical History:   Procedure Laterality Date   • ADENOIDECTOMY     • APPENDECTOMY     • FEM-POP BYPASS     • HERNIA REPAIR     • SHOULDER SURGERY     • TONSILLECTOMY         Social History:   Social History     Socioeconomic History   • Marital status: Single     Spouse name: None   • Number of children: None   • Years of education: None   • Highest education level: None   Occupational History   • None   Social Needs   • Financial resource strain: None   • Food insecurity:     Worry: None     Inability: None   • Transportation needs:     Medical: None     Non-medical: None   Tobacco Use   • Smoking status: Former Smoker     Types: Cigarettes   • Smokeless tobacco: Never Used   • Tobacco comment: Quit 1 year ago    Substance and Sexual Activity   • Alcohol use: Yes     Frequency: Monthly or less     Comment:  Very rare   • Drug use: Yes     Types: Marijuana     Comment: last smoke yesterday abd uses CBD   • Sexual activity: Defer   Lifestyle   • Physical activity:     Days per week: None     Minutes per session: None   • Stress: None   Relationships   • Social connections:     Talks on phone: None     Gets together: None     Attends [employer] service: None     Active member of club or organization: None     Attends meetings of clubs or organizations: None     Relationship status: None   • Intimate partner violence:     Fear of current or ex partner: None     Emotionally abused: None     Physically abused: None     Forced sexual activity: None   Other Topics Concern   • None   Social History Narrative   • None          Travel Exposure:   Travel and Exposure Screening      Most Recent Value   Travel Screening   Overnight hospitalization outside the [place] in the last year?  No Filed On: 04/08/2020 2058   Exposure Screening   Symptoms          Family History:   Family History   Problem Relation Age of Onset   • Diverticulitis Other    • Hypertension Other        Review of Systems    All other systems reviewed and negative except as noted in the HPI.    Medications:    Current IP Meds (From admission, onward)        Frequency     potassium chloride (KLOR-CON) tablet extended release 20 mEq  (Potassium IV/oral replacement)      As needed     potassium chloride (KLOR-CON) tablet extended release 40 mEq  (Potassium IV/oral replacement)      As needed     potassium chloride 20 mEq in 100 mL IVPB  (premix)  (Potassium IV/oral replacement)      As needed     potassium chloride (KCL) 40 mEq/250 mL IVPB in NSS 40 mEq  (Potassium IV/oral replacement)      As needed     potassium chloride 20 mEq packet 20 mEq  (Potassium IV/oral replacement)      As needed     potassium chloride 20 mEq packet 40 mEq  (Potassium IV/oral replacement)      As needed     magnesium sulfate 1 g in sodium chloride 0.9 % 100 mL IVPB  (Magnesium Replacement  Orders - standard)      As needed     magnesium sulfate IVPB 2g in 50 mL NSS/D5W/SWFI  (Magnesium Replacement Orders - standard)      As needed     piperacillin-tazobactam (ZOSYN) 3.375 g in 100 mL NSS vial in bag  Status:  Discontinued      Every 6 hours interval     pantoprazole (PROTONIX) injection 40 mg      Every 12 hours     sodium chloride 0.9 % infusion  (Saline Fluids)      Continuous     morphine injection 4 mg      Every 3 hours PRN     glucose chewable tablet 16-32 g of dextrose  (Hypoglycemia Treatment Protocol and Hyperglycemia Validation Protocol)      As needed     dextrose 40 % oral gel 15-30 g of dextrose  (Hypoglycemia Treatment Protocol and Hyperglycemia Validation Protocol)      As needed     glucagon (GLUCAGEN) injection 1 mg  (Hypoglycemia Treatment Protocol and Hyperglycemia Validation Protocol)      As needed     dextrose in water injection 12.5 g  (Hypoglycemia Treatment Protocol and Hyperglycemia Validation Protocol)      As needed     acetaminophen (TYLENOL) tablet 650 mg  (Analgesics - Acetaminophen pain or fever)      Every 4 hours PRN     ondansetron (ZOFRAN) injection 4 mg  (Nausea/Vomiting)      Every 8 hours PRN     vancomycin (VANCOCIN) IV therapy by pharmacy protocol  (Vancomycin IV therapy by Pharmacy Protocol)  Status:  Discontinued      As needed     morphine injection 2 mg  Status:  Discontinued      Every 3 hours PRN     vancomycin 1.5 g/500 mL IVPB in NSS      Once     piperacillin-tazobactam (ZOSYN) 3.375 g in 100 mL NSS vial in bag      Once     iohexoL (OMNIPAQUE 350) 350 mg iodine/mL solution 125 mL      Once     sodium chloride 0.9 % bolus 1,000 mL      Once     ondansetron (ZOFRAN) injection 4 mg      Once     morphine injection 4 mg      Once                Objective     Vital Signs:    Patient Vitals for the past 72 hrs:   BP Temp Temp src Pulse Resp SpO2 Height Weight   04/09/20 0800 (!) 143/64 36.8 °C (98.2 °F) Temporal 77 18 97 % -- --   04/09/20 0524 (!) 159/78  "36.4 °C (97.6 °F) Oral 86 18 98 % -- --   20 0500 133/66 -- -- 77 16 98 % -- --   20 0135 140/89 -- -- 98 18 97 % -- --   20 0045 120/74 -- -- 88 16 97 % -- --   20 2320 120/72 -- -- 90 16 97 % -- --   20 2221 (!) 159/87 -- -- 92 16 98 % -- --   20 136/89 36.6 °C (97.9 °F) Temporal -- 20 99 % 1.905 m (6' 3\") 97.5 kg (215 lb)       Temp (72hrs), Av.6 °C (97.9 °F), Min:36.4 °C (97.6 °F), Max:36.8 °C (98.2 °F)      Physical Exam:    General appearance: alert and cooperative  Head: normocephalic, without obvious abnormality, atraumatic  Lungs: clear to auscultation bilaterally  Heart: regular rate and rhythm  Abdomen: soft, non-tender  Extremities: edema none  Skin: no rashes  Neurologic: Grossly normal    Lines, Drains, Airways, Wounds:  Peripheral IV 20 Right Arm (Active)   Number of days: 1       Peripheral IV 20 Left Forearm (Active)   Number of days: 1       Labs:    CBC Results       20                    0520 2109 1558         WBC 20.25 25.64 9.10         RBC 5.13 5.87 5.49         HGB 10.7 12.1 11.4         HCT 36.7 41.0 39.2         MCV 71.5 69.8 71.4         MCH 20.9 20.6 20.8         MCHC 29.2 29.5 29.1          599 514                   BMP Results       20                    0520 2109 1558          136 135         K 4.2 3.5 3.2         Cl 107 103 99         CO2 20 17 25         Glucose 118 144 117         BUN 15 19 16         Creatinine 0.9 1.1 1.0         Calcium 9.2 9.9 9.3         Anion Gap 9 16 11         EGFR >60.0 >60.0 >60.0         Comment for K at 2109 on 20:    Results obtained on plasma. Plasma Potassium values may be up to 0.4 mEQ/L less than serum values. The differences may be greater for patients with high platelet or white cell counts.    Comment for K at 1558 on 20:    Results obtained on plasma. Plasma Potassium values may be up to 0.4 mEQ/L less than serum " values. The differences may be greater for patients with high platelet or white cell counts.      PT/PTT Results     No lab values to display.      UA Results       04/08/20                          2312           Color Yellow           Clarity Clear           Glucose Negative           Bilirubin Negative           Ketones +1           Sp Grav <=1.005           Blood Negative           Ph 5.5           Protein Negative           Urobilinogen 0.2           Nitrite Negative           Leuk Est Negative           Comment for Ketones at 2312 on 04/08/20:    Free sulfhydryl drugs such as Mesna, Capoten, and Acetylcysteine (Mucomyst) may cause false positive ketonuria.    Comment for Blood at 2312 on 04/08/20:    The sensitivity of the occult blood test is equivalent to approximately 4 intact RBC/HPF.    Comment for Leuk Est at 2312 on 04/08/20:    Results can be falsely negative due to high specific gravity, some antibiotics, glucose >3 g/dl, or WBC other than neutrophils.      Lactate Results       04/08/20 02/19/16 02/16/16                    2312 0935 1339         Lactate 1.5 1.0 1.5                       Microbiology Results     Procedure Component Value Units Date/Time    Clostridium difficile tox screen Stool [[id number]]  (Normal) Collected:  04/09/20 0709    Specimen:  Stool Updated:  04/09/20 1106     C difficile Toxin Screen Negative    SARS-CoV-2 (COVID-19), PCR Nasopharynx [[id number]]  (Normal) Collected:  04/08/20 2314    Specimen:  Nasopharyngeal Swab from Nasopharynx Updated:  04/09/20 0432     SARS-CoV-2 (COVID-19) Negative          Pathology Results     ** No results found for the last 720 hours. **          Echo:          Imaging:    Radiology Imaging   XR CHEST 1 VW    Narrative CLINICAL HISTORY:  cough/SOB    TECHNIQUE: Frontal chest radiograph.    COMPARISON: CT of the chest 2/19/2016    FINDINGS:    Lungs are clear.  There is no pneumothorax.  Cardiac silhouette within normal limits.  There is no  vascular congestion.        Impression IMPRESSION: No acute pulmonary process.         Assessment     1. Leukocytosis - low concern for infectious etiology given chronicity of abdominal pain and diarrhea along with CT A/P showing no colitis but only questionable for stricture of a loop of mid/distal small bowel on independent review suggesting possible inflammatory process. C diff screen is negative and pt remains afebrile.         Plan     1. D/C pip-tazo and monitor off antibiotics while waiting for colonoscopy with concern for infection being low.

## 2020-04-09 NOTE — PATIENT CARE CONFERENCE
Care Progression Rounds Note  Date: 4/9/2020  Time: 11:55 AM     Patient Name: En Gonsales     Medical Record Number: [id number]   YOB: 1985  Sex: Male      Room/Bed: 0341W    Admitting Diagnosis: Cough [R05]  Generalized abdominal pain [R10.84]  Lower GI bleed [K92.2]  Bloody diarrhea [R19.7]  Intractable abdominal pain [R10.9]  Non-intractable vomiting with nausea, unspecified vomiting type [R11.2]   Admit Date/Time: 4/8/2020  8:54 PM    Primary Diagnosis: Intractable abdominal pain  Principal Problem: Intractable abdominal pain    GMLOS: pending  Anticipated Discharge Date: 4/10/2020    AM-PAC  Mobility Score:      Discharge Planning:       Barriers to Discharge:  Barriers to Discharge: Medical issues not resolved  Comment: cdiff negative colonscopy today ANTIONETTE    Participants:  advanced practice provider, , nursing, social work/services

## 2020-04-09 NOTE — ASSESSMENT & PLAN NOTE
CT scan suggestive of small bowel pathology, possible Crohn's and possible stricture in the mid/distal small bowel without bowel obstruction.  Heme-occult positive.   ED discussed case with on-call GI  Stool samples were obtained   COVID-19 test sent   Patient was initiated on IV Vanco and Zosyn   Hold on IV steroids for now  Supportive measures to include IV Morphine PRN pain and IV Zofran PRN nausea  Patient will be kept NPO  GI and ID will be consulted    04/09/20  Appreciate ID and GI consultations  Pt scheduled for colonoscopy in am. Clear liquids until midnight  Ab dc'ed

## 2020-04-09 NOTE — ED PROVIDER NOTES
HPI     Chief Complaint   Patient presents with   • Abdominal Pain       34 year old M with pmhx of GERD and HTN presents with multiple complaints.  Patient admits to history of chronic abdominal pain onset several months ago.  He has been seen in our ED twice for this pain.  It is been recommended he follows up with a gastroenterologist as an outpatient but he has not yet done this.  Today he presents complaining of worsening pain.  He also complains of intractable vomiting and diarrhea.  He admits to dark stool.  He states he has a hx of GI bleed, however, cannot tell me if a cause for the GI bleed was ever found.  He does not follow with gastroenterologist.  She also notes subjective fever and malaise.  He does not have a thermometer at home to check his temperature.  He also complains of cough, rhinorrhea, congestion, and SOB x several days. His girlfriend was tested for COVID-19 which is not yet resulted.  No known sick contact with individuals who have tested positive for COVID-19.  No recent travel within or outside of [place].  He also notes intermittent episodes of left-sided chest pain, currently resolved.  No pmhx of CAD/MI/DVT/PE.  No recent long travel, recent surgery, unilateral calf pain/swelling, hemoptysis, hormone use, or known active malignancy.           Patient History     Past Medical History:   Diagnosis Date   • GERD (gastroesophageal reflux disease)    • Hypertension        Past Surgical History:   Procedure Laterality Date   • ADENOIDECTOMY     • APPENDECTOMY     • FEM-POP BYPASS     • HERNIA REPAIR     • SHOULDER SURGERY     • TONSILLECTOMY         History reviewed. No pertinent family history.    Social History     Tobacco Use   • Smoking status: Former Smoker   • Smokeless tobacco: Never Used   Substance Use Topics   • Alcohol use: Yes     Comment: Very rare   • Drug use: Yes     Types: Marijuana     Comment: last smoke yesterday abd uses CBD       Systems Reviewed from Nursing  "Triage:          Review of Systems     Review of Systems   Constitutional: Positive for chills, fatigue and fever (subjective).   HENT: Positive for congestion and rhinorrhea. Negative for sore throat.    Eyes: Negative for photophobia and visual disturbance.   Respiratory: Positive for cough and shortness of breath.    Cardiovascular: Positive for chest pain (intermittent). Negative for palpitations and leg swelling.   Gastrointestinal: Positive for abdominal pain, blood in stool, diarrhea and nausea. Negative for vomiting.   Genitourinary: Negative for difficulty urinating, dysuria, frequency and urgency.   Musculoskeletal: Negative for back pain and neck pain.   Skin: Negative for color change, pallor, rash and wound.   Neurological: Positive for light-headedness. Negative for dizziness, syncope, weakness, numbness and headaches.        Physical Exam     ED Triage Vitals   Temp Heart Rate Resp BP SpO2   04/08/20 2057 04/08/20 2221 04/08/20 2057 04/08/20 2057 04/08/20 2057   36.6 °C (97.9 °F) 92 20 136/89 99 %      Temp Source Heart Rate Source Patient Position BP Location FiO2 (%) (Set)   04/08/20 2057 04/08/20 2221 04/08/20 2057 04/08/20 2057 --   Temporal Monitor Lying Right upper arm                      Patient Vitals for the past 24 hrs:   BP Temp Temp src Pulse Resp SpO2 Height Weight   04/08/20 2221 (!) 159/87 -- -- 92 16 98 % -- --   04/08/20 2057 136/89 36.6 °C (97.9 °F) Temporal -- 20 99 % 1.905 m (6' 3\") 97.5 kg (215 lb)                                       Physical Exam   Constitutional: He is oriented to person, place, and time. He appears well-developed and well-nourished. He appears distressed.   HENT:   Head: Atraumatic.   Eyes: Conjunctivae and EOM are normal.   Neck: Normal range of motion. Neck supple.   Cardiovascular: Regular rhythm. Tachycardia present.   Pulmonary/Chest: Effort normal and breath sounds normal. No respiratory distress. He has no wheezes. He has no rales.   Abdominal: Soft. " Bowel sounds are normal. He exhibits no distension. There is tenderness (diffuse mild tenderness; moderate lower tenderness). There is no guarding.   Genitourinary: Rectal exam shows guaiac positive stool (Mostly mucus in the rectal vault, heme positive with MANISH and guaiac test).   Musculoskeletal: Normal range of motion. He exhibits no edema.   No LE edema. Calves are symmetrical in appearance without any erythema, tenderness, or palpable cords.    Neurological: He is alert and oriented to person, place, and time.   Skin: Skin is warm and dry. Capillary refill takes less than 2 seconds. He is not diaphoretic.   Psychiatric: He has a normal mood and affect. His behavior is normal. Thought content normal.   Nursing note and vitals reviewed.           ECG 12 lead  Date/Time: 4/9/2020 12:15 AM  Performed by: Melany Rollins PA C  Authorized by: Godshall, Duane K, MD     ECG reviewed by ED Physician in the absence of a cardiologist: yes    Rate:     ECG rate:  104    ECG rate assessment: tachycardic    Rhythm:     Rhythm: sinus tachycardia    Ectopy:     Ectopy: none    QRS:     QRS axis:  Normal    QRS intervals:  Normal  Conduction:     Conduction: normal    ST segments:     ST segments:  Normal  T waves:     T waves: normal    Q waves:     Q waves:  I, II, III, aVF, V4, V5 and V6        ED Course & MDM     Labs Reviewed   CBC AND DIFF - Abnormal       Result Value    WBC 25.64 (*)     RBC 5.87 (*)     Hemoglobin 12.1 (*)     Hematocrit 41.0      MCV 69.8 (*)     MCH 20.6 (*)     MCHC 29.5 (*)     RDW 17.2 (*)     Platelets 599 (*)     MPV 10.8      Differential Type Manu      Neutrophils 96      Lymphocytes 0      Monocytes 0      Eosinophils 0      Basophils 0      Bands 4      Neutrophils, Absolute 24.61 (*)     Lymphocytes, Absolute 0.00 (*)     Monocytes, Absolute 0.00 (*)     Eosinophils, Absolute 0.00 (*)     Basophils, Absolute 0.00 (*)     Bands, Absolute 1.03 (*)     Platelet Estimate Increased  (>400,000)      Giant Platelets Occasional      Smudge Cells Occasional      Anisocytosis 1+      Hypochromia 1+      Ovalocytes 1+      Poikilocytes 1+      Polychromasia Occasional      Schistocytes Occasional     COMPREHENSIVE METABOLIC PANEL - Abnormal    Sodium 136      Potassium 3.5 (*)     Chloride 103      CO2 17 (*)     BUN 19      Creatinine 1.1      Glucose 144 (*)     Calcium 9.9      AST (SGOT) 19      ALT (SGPT) 16      Alkaline Phosphatase 59      Total Protein 9.0 (*)     Albumin 5.4 (*)     Bilirubin, Total 1.1      eGFR >60.0      Anion Gap 16 (*)    TROPONIN I - Normal    Troponin I <0.02     RAINBOW DRAW PANEL    Narrative:     The following orders were created for panel order [facility] Draw Panel.  Procedure                               Abnormality         Status                     ---------                               -----------         ------                     RAINBOW RED[[id number]]                                      In process                 RAINBOW LT BLUE[[id number]]                                  In process                 RAINBOW GOLD[[id number]]                                     In process                   Please view results for these tests on the individual orders.   RAINBOW RED   RAINBOW LT BLUE   RAINBOW GOLD       X-RAY CHEST 1 VIEW   Final Result   IMPRESSION: No acute pulmonary process.         ECG 12 lead    (Results Pending)   CT ABDOMEN PELVIS WITH IV CONTRAST    (Results Pending)               [facility]         ED Course as of Apr 09 0001 Wed Apr 08, 2020 2121 Impression: multiple complaints - lower abd pain; intractable vomiting; melena; cough/SOB/rhinorrheaPlan: IV, EKG, CXR, CT A/P with IV contrast, labs, trop, IV morphine, IV zofran, 1L NS bolus, cardiac monitoring     [CW]   2127 WBC(!): 25.64 [CW]   2203 IMPRESSION: No acute pulmonary process.   X-RAY CHEST 1 VIEW [CW]   2236 Hemoglobin(!): 12.1 [CW]   2245 IMPRESSION:  Suspect small bowel pathology, possible  Crohn's.  Possible stricture in the  mid/distal small bowel.  No bowel obstruction.  Findings could be better evaluated with nonemergent MR enterography.   CT ABDOMEN PELVIS WITH IV CONTRAST [CW]   2340 HMS to admit.  Broad-spectrum antibiotics ordered.  Discussed case with GI (Gil) who recommends to obtain stool cultures to rule out infectious colitis.  She does not recommend IV steroids at this time.    [CW]      ED Course User Index  [CW] Melany Rollins PA C         Clinical Impressions as of Apr 09 0001   Generalized abdominal pain   Non-intractable vomiting with nausea, unspecified vomiting type   Bloody diarrhea   Lower GI bleed   Cough        Melany Rollins PA C  04/09/20 0037

## 2020-04-09 NOTE — H&P
"   Hospital Medicine Service -  History & Physical        CHIEF COMPLAINT     Chief Complaint   Patient presents with   • Abdominal Pain        HISTORY OF PRESENT ILLNESS      34 y.o. male with a past medical history of HTN, GERD and migraines that presents for abdominal pain.  Patient reports chronic abdominal pain x 6 months, which worsened today.  He reports pain typically constant < 5/10 intensity, but today he had LLQ \"pinching\" sensation 10/10 intensity.  He reports liquid diarrhea with dark blood x 3 days, > 10 episodes today.  He reports nausea and dry heaves.  He was unable to tolerate any oral intake today and feels as though he is dehydrated. He reports chills, but did not take his temperature.  He reports lightheadedness with near-syncope.  He reports cough and rhinorrhea, which he attributes to allergies.  He reports his girlfriend was tested for COVID-19 today.  He denies dizziness, sore throat, chest pain, dyspnea, dysuria.  He does not follow with GI.  He admits to dieting and fasting for 10-12 hrs per day recently in attempts to treat his abdominal pain. He reports he recently took Azithromycin for 2 days.  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  He no longer smokes cigarettes, quit 1 year ago.  He rarely drinks alcohol.  He smokes marijuana daily, last use yesterday.      ED workup: Tachycardic.  Otherwise VSS within normal limits.  Lab workup shows hypokalemia, metabolic acidosis with increased anion gap, leukocytosis, anemia, thrombocytosis. UA with +1 ketone.  Heme-occult positive. CXR no acute process.  CT A/P with contrast suggestive of small bowel pathology, possible Crohn's and stricture in the mid/distal small bowel; no bowel obstruction. Patient received IV fluid, IV Vanco and IV Zosyn.  ED discussed with on-call GI, to continue IV antibiotics and hold on IV steroids for now.  Patient will be admitted for further workup and evaluation of abdominal pain.  GI " and ID consulted.        PAST MEDICAL AND SURGICAL HISTORY      Past Medical History:   Diagnosis Date   • GERD (gastroesophageal reflux disease)    • Hypertension    • Migraine        Past Surgical History:   Procedure Laterality Date   • ADENOIDECTOMY     • APPENDECTOMY     • FEM-POP BYPASS     • HERNIA REPAIR     • SHOULDER SURGERY     • TONSILLECTOMY         MEDICATIONS      Prior to Admission medications    Medication Sig Start Date End Date Taking? Authorizing Provider   dicyclomine (BENTYL) 20 mg tablet Take 1 tablet (20 mg total) by mouth 2 (two) times a day. 2/27/20 8/25/20  Mariangel Caraballo MD   famotidine (PEPCID) 40 mg tablet Take 1 tablet (40 mg total) by mouth 2 (two) times a day. 5/31/19 8/29/19  Gavin Norton MD   lisinopriL (PRINIVIL) 10 mg tablet Take 10 mg by mouth once daily. 2/24/20   ProviderGriffin MD   ondansetron ODT (ZOFRAN ODT) 4 mg disintegrating tablet Take 1 tablet (4 mg total) by mouth every 8 (eight) hours as needed for nausea or vomiting for up to 7 days. 2/27/20 3/5/20  Mariangel Caraballo MD       ALLERGIES      Benadryl [diphenhydramine hcl]    FAMILY HISTORY      Family History   Problem Relation Age of Onset   • Diverticulitis Other    • Hypertension Other        SOCIAL HISTORY      Social History     Socioeconomic History   • Marital status: Single     Spouse name: None   • Number of children: None   • Years of education: None   • Highest education level: None   Occupational History   • None   Social Needs   • Financial resource strain: None   • Food insecurity:     Worry: None     Inability: None   • Transportation needs:     Medical: None     Non-medical: None   Tobacco Use   • Smoking status: Former Smoker     Types: Cigarettes   • Smokeless tobacco: Never Used   • Tobacco comment: Quit 1 year ago    Substance and Sexual Activity   • Alcohol use: Yes     Frequency: Monthly or less     Comment: Very rare   • Drug use: Yes     Types: Marijuana     Comment: last smoke  yesterday abd uses CBD   • Sexual activity: Defer   Lifestyle   • Physical activity:     Days per week: None     Minutes per session: None   • Stress: None   Relationships   • Social connections:     Talks on phone: None     Gets together: None     Attends Islam service: None     Active member of club or organization: None     Attends meetings of clubs or organizations: None     Relationship status: None   • Intimate partner violence:     Fear of current or ex partner: None     Emotionally abused: None     Physically abused: None     Forced sexual activity: None   Other Topics Concern   • None   Social History Narrative   • None       REVIEW OF SYSTEMS        Review of Systems  All others reviewed and negative.       PHYSICAL EXAMINATION      Temp:  [36.6 °C (97.9 °F)] 36.6 °C (97.9 °F)  Heart Rate:  [88-92] 88  Resp:  [16-20] 16  BP: (120-159)/(72-89) 120/74  Body mass index is 26.87 kg/m².  Physical Exam   Constitutional: He is oriented to person, place, and time. He appears well-developed and well-nourished. No distress.   Eyes: Pupils are equal, round, and reactive to light. Conjunctivae and EOM are normal.   Neck: Normal range of motion. Neck supple.   Cardiovascular: Normal rate, regular rhythm, normal heart sounds and intact distal pulses.   Pulmonary/Chest: Effort normal and breath sounds normal. No respiratory distress.   Abdominal: Soft. Bowel sounds are normal. He exhibits no distension. There is tenderness (LLQ).   Genitourinary: Rectal exam shows guaiac positive stool.   Musculoskeletal: Normal range of motion. He exhibits no edema, tenderness or deformity.   Neurological: He is alert and oriented to person, place, and time.   Skin: There is pallor.   Psychiatric: He has a normal mood and affect.         LABS / IMAGING / EKG        Labs  CBC Results       04/08/20 02/27/20 05/31/19                    2109 1558 1005         WBC 25.64 9.10 5.40         RBC 5.87 5.49 4.62         HGB 12.1 11.4 9.1          HCT 41.0 39.2 31.8         MCV 69.8 71.4 68.8         MCH 20.6 20.8 19.7         MCHC 29.5 29.1 28.6          514 593                     CMP Results       04/08/20 02/27/20 05/31/19                    2109 1558 1005          135 139         K 3.5 3.2 3.5         Cl 103 99 108         CO2 17 25 22         Glucose 144 117 101         BUN 19 16 15         Creatinine 1.1 1.0 1.0         Calcium 9.9 9.3 9.1         Anion Gap 16 11 9         AST 19 21 16         ALT 16 16 17         Albumin 5.4 5.1 4.5         EGFR >60.0 >60.0 >60.0         Comment for K at 2109 on 04/08/20:    Results obtained on plasma. Plasma Potassium values may be up to 0.4 mEQ/L less than serum values. The differences may be greater for patients with high platelet or white cell counts.    Comment for K at 1558 on 02/27/20:    Results obtained on plasma. Plasma Potassium values may be up to 0.4 mEQ/L less than serum values. The differences may be greater for patients with high platelet or white cell counts.    Comment for K at 1005 on 05/31/19:    Results obtained on plasma. Plasma Potassium values may be up to 0.4 mEQ/L less than serum values. The differences may be greater for patients with high platelet or white cell counts.          Troponin I Results       04/08/20 05/31/19 02/16/16 2109 1005 1339         Troponin I <0.02 <0.02 <0.02  A rise or fall of troponin with at least one abnormal value is consistent with myocardial injury or necrosis in the presence of appropriate clinical, ECG, and/or imaging abnormalities.                       Microbiology Results     ** No results found for the last 720 hours. **        UA Results       04/08/20                          2312           Color Yellow           Clarity Clear           Glucose Negative           Bilirubin Negative           Ketones +1           Sp Grav <=1.005           Blood Negative           Ph 5.5           Protein Negative           Urobilinogen  0.2           Nitrite Negative           Leuk Est Negative           Comment for Ketones at 2312 on 04/08/20:    Free sulfhydryl drugs such as Mesna, Capoten, and Acetylcysteine (Mucomyst) may cause false positive ketonuria.    Comment for Blood at 2312 on 04/08/20:    The sensitivity of the occult blood test is equivalent to approximately 4 intact RBC/HPF.    Comment for Leuk Est at 2312 on 04/08/20:    Results can be falsely negative due to high specific gravity, some antibiotics, glucose >3 g/dl, or WBC other than neutrophils.          Imaging  ECG 12 lead   ED Interpretation   Melany Rollins PA C     4/9/2020 12:37 AM   ECG 12 lead   Date/Time: 4/9/2020 12:15 AM   Performed by: Melany Rollins PA C   Authorized by: Godshall, Duane K, MD       ECG reviewed by ED Physician in the absence of a cardiologist: yes     Rate:      ECG rate:  104     ECG rate assessment: tachycardic     Rhythm:      Rhythm: sinus tachycardia     Ectopy:      Ectopy: none     QRS:      QRS axis:  Normal     QRS intervals:  Normal   Conduction:      Conduction: normal     ST segments:      ST segments:  Normal   T waves:      T waves: normal     Q waves:      Q waves:  I, II, III, aVF, V4, V5 and V6            CT ABDOMEN PELVIS WITH IV CONTRAST   Final Result   IMPRESSION:   Suspect small bowel pathology, possible Crohn's.  Possible stricture in the   mid/distal small bowel.  No bowel obstruction.   Findings could be better evaluated with nonemergent MR enterography.                        X-RAY CHEST 1 VIEW   Final Result   IMPRESSION: No acute pulmonary process.               ECG/Telemetry  Reviewed by me    ASSESSMENT AND PLAN           * Intractable abdominal pain  Assessment & Plan  CT scan suggestive of small bowel pathology, possible Crohn's and possible stricture in the mid/distal small bowel without bowel obstruction.  Heme-occult positive.   ED discussed case with on-call GI  Stool samples were obtained    COVID-19 test sent   Patient was initiated on IV Vanco and Zosyn   Hold on IV steroids for now  Supportive measures to include IV Morphine PRN pain and IV Zofran PRN nausea  Patient will be kept NPO  GI and ID will be consulted    Anemia  Assessment & Plan  Hgb range 9.1-11.4 in the past 1 year   Today Hgb 12.1, perhaps heme-concentrated   Monitor daily labs    Metabolic acidosis, increased anion gap (IAG)  Assessment & Plan  Continue IV fluid hydration   Monitor BMP    Hypokalemia  Assessment & Plan  Monitor daily labs and replace as needed     Hypertension  Assessment & Plan  Blood pressure within normal limits   Hold Lisinopril while NPO   IV Hydralazine PRN SBP > 160      VTE Assessment: Padua    VTE Prophylaxis Plan:   Code Status: Full Code       TAMELA Rain  4/9/2020

## 2020-04-09 NOTE — ED ATTESTATION NOTE
"Procedures  Physical Exam  Review of Systems    4/8/20209:25 PM  I have personally seen and examined the patient. I have reviewed and agree with the PA/NP/Resident's assessment and ED plan of care. My examination, assessment and plan of care of En Gonsales is as follows:    Patient is a 34-year-old male who presents with abdominal discomfort, patient reports the discomfort to be primarily left-sided, patient has been having symptoms for months, has been seen in the emergency department on multiple occasions without a diagnosis being made, was referred to GI in the \"summer\" but the patient has not seen them to date, patient is not running a fever in the emergency department, patient has a history of an appendectomy and a hernia repair as well as a reported femoropopliteal bypass    Exam:   Heart: RRR, normal S1/S2  Lungs: CTA bilaterally  Abdo: soft, non-tender    Plan: Patient is a 34-year-old male who presents with abdominal discomfort, checking labs and imaging, reevaluate afterwards          Godshall, Duane K, MD  04/08/20 2128    "

## 2020-04-09 NOTE — PLAN OF CARE
Problem: Adult Inpatient Plan of Care  Goal: Readiness for Transition of Care  Intervention: Mutually Develop Transition Plan  Flowsheets (Taken 4/9/2020 1603)  Anticipated Discharge Disposition: home without services  Equipment Needed After Discharge: none  Equipment Currently Used at Home: none  Anticipated Changes Related to Illness: none  Transportation Concerns: car, none  Current Discharge Risk: chronically ill  Readmission Within the Last 30 Days: no previous admission in last 30 days  Patient/Family Anticipated Services at Transition: none  Patient/Family Anticipates Transition to: home  Transportation Anticipated: family or friend will provide  Concerns to be Addressed: denies needs/concerns at this time  Offered/Gave Vendor List: no   CM met with the patient after multiple phone calls to his cell phone [phone] today were unanswered ([phone]).  The patient states that he just turned on his phone.  He verified all of his information.  He states that he lives with his girlfriend, Vanessa [phone], in [place], but his permanent address is with his parent's house in [place].  He reports no home care needs at this time.  He is awaiting a colonoscopy procedure tomorrow.  PLAN:  HOME without services. Yuan Webster RN

## 2020-04-09 NOTE — CONSULTS
GASTROENTEROLOGY CONSULTATION   [facility]     PATIENT NAME:  En Gonsales        YOB: 1985   AGE:  34 y.o.         MRN:  [id number]              HISTORY   CC: Patient seen in consultation at the request of hospital medical service for evaluation of symptoms as well as reportedly abnormal findings on CT scan.    This is a pleasant 34-year-old gentleman who presents to the emergency department with exacerbation of chronic symptoms to the point where he is no longer able to maintain hydration, nor manage symptoms.  For 6 months or so he has had episodes of lower abdominal pain, just to the left of midline, stabbing in quality, associated with recurrent diarrhea and passage of blood in his stool.  He tells me he is lost 90 pounds in the past year or so.  He acknowledges that part of this weight loss may be due to changes in his psychiatric medications.  He is bipolar, currently on no medicines.  Seroquel was stopped.  He is currently following with no mental health care provider.    He has nausea, he has had episodes of vomiting at times.  At times he has chills.  He was in the emergency department last summer and was told to follow-up with gastroenterology at that time, but then his symptoms improved for a period of time, he had a change in career, and he put this on hold.  He was trying to manage his symptoms at home with fasting.  He also uses marijuana 3 or 4 times a day.  He worked in the marijuana industry and I did discuss the possibility with him today of whether a component of his symptoms could be marijuana induced hyperemesis.  He appears to have good insight into his mental health history.    PAST MEDICAL HISTORY     Past Medical History:   Diagnosis Date   • GERD (gastroesophageal reflux disease)    • Hypertension    • Migraine    Patient describes his mental health history as bipolar.    PAST SURGICAL HISTORY     Past Surgical History:   Procedure Laterality Date   • ADENOIDECTOMY     •  APPENDECTOMY     • FEM-POP BYPASS     • HERNIA REPAIR     • SHOULDER SURGERY     • TONSILLECTOMY     Is history of femoropopliteal bypass accurate?     FAMILY HISTORY     Family History   Problem Relation Age of Onset   • Diverticulitis Other    • Hypertension Other        SOCIAL HISTORY     Social History     Tobacco Use   • Smoking status: Former Smoker     Types: Cigarettes   • Smokeless tobacco: Never Used   • Tobacco comment: Quit 1 year ago    Substance Use Topics   • Alcohol use: Yes     Frequency: Monthly or less     Comment: Very rare       MEDICATIONS   Home Medication:  •  dicyclomine, Take 1 tablet (20 mg total) by mouth 2 (two) times a day.  •  famotidine, Take 1 tablet (40 mg total) by mouth 2 (two) times a day.  •  lisinopriL, Take 10 mg by mouth once daily.  •  ondansetron ODT, Take 1 tablet (4 mg total) by mouth every 8 (eight) hours as needed for nausea or vomiting for up to 7 days.    MEDICATIONS:  Infusions:    • sodium chloride 0.9 %   100 mL/hr at 04/09/20 0522          Scheduled:   • pantoprazole  40 mg intravenous q12h SOLOMON   • piperacillin-tazobactam  3.375 g intravenous q6h INT       ALLERGIES     Allergies   Allergen Reactions   • Benadryl [Diphenhydramine Hcl]        REVIEW OF SYSTEMS   Systems reviewed and otherwise negative except as documented above.    PHYSICAL EXAMINATION   Temp:  [36.4 °C (97.6 °F)-36.8 °C (98.2 °F)] 36.8 °C (98.2 °F)  Heart Rate:  [77-98] 77  Resp:  [16-20] 18  BP: (120-159)/(64-89) 143/64      Intake/Output Summary (Last 24 hours) at 4/9/2020 1008  Last data filed at 4/9/2020 0117  Gross per 24 hour   Intake 1600 ml   Output --   Net 1600 ml       General appearance: well developed, he is pale and mildly ill-appearing.  Head: normocephalic, atraumatic  Eyes: EOMI  ENT: oral mucosa moist  Lungs:  non-labored respirations  Heart: regular rate and rhythm  Abdomen: soft, non-tender; bowel sounds normal; no masses, no organomegaly  Rectal: Deferred  Extremities: no  edema  Skin:  Warm, dry, no rashes, no lesions, no jaundice  Neurologic: awake, alert & oriented x 3  Psych: cooperative with exam, appropriate mood and affect; very pleasant and cooperative    Diagnostic Data:     Labs reviewed  Results from last 7 days   Lab Units 04/09/20  0520 04/08/20  2109   WBC K/uL 20.25* 25.64*   HEMOGLOBIN g/dL 10.7* 12.1*   HEMATOCRIT % 36.7* 41.0   PLATELETS K/uL 555* 599*   ]  Results from last 7 days   Lab Units 04/09/20  0520 04/08/20  2109   SODIUM mEQ/L 136 136   POTASSIUM mEQ/L 4.2 3.5*   CHLORIDE mEQ/L 107 103   CO2 mEQ/L 20* 17*   BUN mg/dL 15 19   CREATININE mg/dL 0.9 1.1   CALCIUM mg/dL 9.2 9.9   ALBUMIN g/dL 4.5 5.4*   BILIRUBIN TOTAL mg/dL 0.9 1.1   ALK PHOS IU/L 44 59   ALT IU/L 13* 16   AST IU/L 17 19   GLUCOSE mg/dL 118* 144*   ]    ]    ]    Imaging reviewed  Ct Abdomen Pelvis With Iv Contrast    Result Date: 4/8/2020  IMPRESSION: Suspect small bowel pathology, possible Crohn's.  Possible stricture in the mid/distal small bowel.  No bowel obstruction. Findings could be better evaluated with nonemergent MR enterography.     X-ray Chest 1 View    Result Date: 4/8/2020  IMPRESSION: No acute pulmonary process.         ASSESSMENT/PLAN   Impressions:    1.  6 months of gastrointestinal symptoms including lower abdominal pain, diarrhea, nausea, and rectal bleeding.    2.  Reportedly abnormal CT scan of the abdomen and pelvis.  I reviewed this independently and with the radiologist, and the findings as described are equivocal and we question the validity of the original report.    3.  History of bipolar illness.  I discussed the importance of follow-up with a mental health care provider and the patient agrees that this would be appropriate in the near future.  He is currently managing however.    4.  Leukocytosis.    5.  Daily marijuana use.  Rule out component of marijuana hyperemesis syndrome.    This is a very pleasant patient who clearly has history very concerning for the  possibility of inflammatory bowel disease.  Based on his examination, and my review of the CT scan, I think it would be appropriate to attempt to perform a colonoscopy tomorrow.  We will attempt a bowel preparation today.  This was reviewed with the patient.  His abdomen is quite soft and benign today.  He is actually thirsty and quite willing to go through with a bowel preparation.  I think it would be wise to do the colonoscopy first prior to any further imaging the small intestine which would require ingestion of either barium or other contrast.  Continue antibiotics for the moment.  Await ID input.    AUTHOR:  Roberto Escalante,   4/9/2020

## 2020-04-09 NOTE — ASSESSMENT & PLAN NOTE
Blood pressure within normal limits   Hold Lisinopril while NPO   IV Hydralazine PRN SBP > 160    04/09/20   bp controlled. Continue current meds

## 2020-04-09 NOTE — PROGRESS NOTES
"   Hospital Medicine Service -  Daily Progress Note       SUBJECTIVE   Interval History: had a small bm today. No fever. abd pain is much improved.      OBJECTIVE      Vital signs in last 24 hours:  Temp:  [36.4 °C (97.6 °F)-37 °C (98.6 °F)] 37 °C (98.6 °F)  Heart Rate:  [77-98] 86  Resp:  [16-20] 18  BP: (120-159)/(64-89) 129/71    Intake/Output Summary (Last 24 hours) at 4/9/2020 1501  Last data filed at 4/9/2020 1205  Gross per 24 hour   Intake 1945 ml   Output --   Net 1945 ml       PHYSICAL EXAMINATION      Visit Vitals  /71 (BP Location: Left upper arm, Patient Position: Lying)   Pulse 86   Temp 37 °C (98.6 °F) (Temporal)   Resp 18   Ht 1.905 m (6' 3\")   Wt 97.5 kg (215 lb)   SpO2 95%   BMI 26.87 kg/m²       General Appearance:  Alert, cooperative, no distress, appears stated age   Head:  Normocephalic, without obvious abnormality, atraumatic   Eyes:  PERRL, conjunctiva/corneas clear, EOM's intact, fundi benign, both eyes   Ears:  Normal TM's and external ear canals, both ears   Nose: Nares normal, septum midline,mucosa normal, no drainage or sinus tenderness   Throat: Lips, mucosa, and tongue normal; teeth and gums normal   Neck: Supple, symmetrical, trachea midline, no adenopathy;  thyroid: not enlarged, symmetric, no tenderness/mass/nodules; no carotid bruit or JVD   Back:   Symmetric, no curvature, ROM normal, no CVA tenderness   Lungs:   Clear to auscultation bilaterally, respirations unlabored   Breasts:  No masses or tenderness   Heart:  Regular rate and rhythm, S1 and S2 normal, no murmur, rub, or gallop   Abdomen:   Soft, non-tender, bowel sounds active all four quadrants,  no masses, no organomegaly   Pelvic: Deferred   Extremities: Extremities normal, atraumatic, no cyanosis or edema   Pulses: 2+ and symmetric   Skin: Skin color, texture, turgor normal, no rashes or lesions   Lymph nodes: Cervical, supraclavicular, and axillary nodes normal   Neurologic: Normal        LINES, CATHETERS, DRAINS, " AIRWAYS, AND WOUNDS   Lines, Drains, Airways, Wounds:  Peripheral IV 04/08/20 Right Arm (Active)   Number of days: 1       Peripheral IV 04/08/20 Left Forearm (Active)   Number of days: 1       Comments:      LABS / IMAGING / TELE      Labs  CMP Results       04/09/20 04/08/20 02/27/20                    0520 2109 1558          136 135         K 4.2 3.5 3.2         Cl 107 103 99         CO2 20 17 25         Glucose 118 144 117         BUN 15 19 16         Creatinine 0.9 1.1 1.0         Calcium 9.2 9.9 9.3         Anion Gap 9 16 11         AST 17 19 21         ALT 13 16 16         Albumin 4.5 5.4 5.1         EGFR >60.0 >60.0 >60.0         Comment for K at 2109 on 04/08/20:    Results obtained on plasma. Plasma Potassium values may be up to 0.4 mEQ/L less than serum values. The differences may be greater for patients with high platelet or white cell counts.    Comment for K at 1558 on 02/27/20:    Results obtained on plasma. Plasma Potassium values may be up to 0.4 mEQ/L less than serum values. The differences may be greater for patients with high platelet or white cell counts.          CBC Results       04/09/20 04/08/20 02/27/20                    0520 2109 1558         WBC 20.25 25.64 9.10         RBC 5.13 5.87 5.49         HGB 10.7 12.1 11.4         HCT 36.7 41.0 39.2         MCV 71.5 69.8 71.4         MCH 20.9 20.6 20.8         MCHC 29.2 29.5 29.1          599 514                           Imaging  Ct Abdomen Pelvis With Iv Contrast    Result Date: 4/8/2020  IMPRESSION: Suspect small bowel pathology, possible Crohn's.  Possible stricture in the mid/distal small bowel.  No bowel obstruction. Findings could be better evaluated with nonemergent MR enterography.     X-ray Chest 1 View    Result Date: 4/8/2020  IMPRESSION: No acute pulmonary process.       ECG/Telemetry: I have reviewed all ECGs.     ASSESSMENT AND PLAN      Hypomagnesemia  Assessment & Plan  Replaced this am. On electrolyte replacement  protocol    Anemia  Assessment & Plan  Hgb range 9.1-11.4 in the past 1 year   Today Hgb 12.1, perhaps heme-concentrated   Monitor daily labs    Metabolic acidosis, increased anion gap (IAG)  Assessment & Plan  Continue IV fluid hydration   Monitor BMP    Hypokalemia  Assessment & Plan  Monitor daily labs and replace as needed     04/09/20  On electrolyte replacement protocol    Hypertension  Assessment & Plan  Blood pressure within normal limits   Hold Lisinopril while NPO   IV Hydralazine PRN SBP > 160    04/09/20   bp controlled. Continue current meds      * Intractable abdominal pain  Assessment & Plan  CT scan suggestive of small bowel pathology, possible Crohn's and possible stricture in the mid/distal small bowel without bowel obstruction.  Heme-occult positive.   ED discussed case with on-call GI  Stool samples were obtained   COVID-19 test sent   Patient was initiated on IV Vanco and Zosyn   Hold on IV steroids for now  Supportive measures to include IV Morphine PRN pain and IV Zofran PRN nausea  Patient will be kept NPO  GI and ID will be consulted    04/09/20  Appreciate ID and GI consultations  Pt scheduled for colonoscopy in am. Clear liquids until midnight  Ab dc'ed             VTE Assessment: Padua  4  VTE Prophylaxis Plan: scd  Code Status: Full Code  Estimated Discharge Date: 4/10/2020  Disposition Planning:home when stable     Lizbeth Tracey MD  4/9/2020

## 2020-04-10 ENCOUNTER — ANESTHESIA EVENT (INPATIENT)
Dept: ENDOSCOPY | Facility: HOSPITAL | Age: 35
End: 2020-04-10
Payer: COMMERCIAL

## 2020-04-10 ENCOUNTER — ANESTHESIA (INPATIENT)
Dept: ENDOSCOPY | Facility: HOSPITAL | Age: 35
End: 2020-04-10
Payer: COMMERCIAL

## 2020-04-10 ENCOUNTER — APPOINTMENT (INPATIENT)
Dept: RADIOLOGY | Facility: HOSPITAL | Age: 35
End: 2020-04-10
Attending: INTERNAL MEDICINE
Payer: COMMERCIAL

## 2020-04-10 PROBLEM — D50.9 IRON DEFICIENCY ANEMIA: Status: ACTIVE | Noted: 2020-04-10

## 2020-04-10 LAB
ANION GAP SERPL CALC-SCNC: 6 MEQ/L (ref 3–15)
BUN SERPL-MCNC: 8 MG/DL (ref 8–20)
CALCIUM SERPL-MCNC: 8.5 MG/DL (ref 8.9–10.3)
CHLORIDE SERPL-SCNC: 107 MEQ/L (ref 98–109)
CO2 SERPL-SCNC: 24 MEQ/L (ref 22–32)
CREAT SERPL-MCNC: 1 MG/DL (ref 0.8–1.3)
CRP SERPL-MCNC: <6 MG/L
ERYTHROCYTE [DISTWIDTH] IN BLOOD BY AUTOMATED COUNT: 16.6 % (ref 11.6–14.4)
FERRITIN SERPL-MCNC: 8 NG/ML (ref 24–250)
GFR SERPL CREATININE-BSD FRML MDRD: >60 ML/MIN/1.73M*2
GLUCOSE SERPL-MCNC: 87 MG/DL (ref 70–99)
HCT VFR BLDCO AUTO: 29.3 % (ref 40.1–51)
HGB BLD-MCNC: 8.6 G/DL (ref 13.7–17.5)
IRON SATN MFR SERPL: 3 % (ref 15–45)
IRON SERPL-MCNC: 10 UG/DL (ref 35–150)
MCH RBC QN AUTO: 21.2 PG (ref 28–33.2)
MCHC RBC AUTO-ENTMCNC: 29.4 G/DL (ref 32.2–36.5)
MCV RBC AUTO: 72.3 FL (ref 83–98)
PDW BLD AUTO: 10.7 FL (ref 9.4–12.4)
PLATELET # BLD AUTO: 373 K/UL (ref 150–350)
POTASSIUM SERPL-SCNC: 3.3 MEQ/L (ref 3.6–5.1)
RBC # BLD AUTO: 4.05 M/UL (ref 4.5–5.8)
SODIUM SERPL-SCNC: 137 MEQ/L (ref 136–144)
TIBC SERPL-MCNC: 343 UG/DL (ref 270–460)
UIBC SERPL-MCNC: 333 UG/DL (ref 180–360)
WBC # BLD AUTO: 7.87 K/UL (ref 3.8–10.5)

## 2020-04-10 PROCEDURE — 25800000 HC PHARMACY IV SOLUTIONS: Performed by: HOSPITALIST

## 2020-04-10 PROCEDURE — 25000000 HC PHARMACY GENERAL: Performed by: HOSPITALIST

## 2020-04-10 PROCEDURE — 63600000 HC DRUGS/DETAIL CODE: Performed by: HOSPITALIST

## 2020-04-10 PROCEDURE — 80048 BASIC METABOLIC PNL TOTAL CA: CPT | Performed by: INTERNAL MEDICINE

## 2020-04-10 PROCEDURE — 25500000 HC DRUGS/INCIDENT RAD: Performed by: INTERNAL MEDICINE

## 2020-04-10 PROCEDURE — 88305 TISSUE EXAM BY PATHOLOGIST: CPT | Performed by: INTERNAL MEDICINE

## 2020-04-10 PROCEDURE — 25800000 HC PHARMACY IV SOLUTIONS: Performed by: INTERNAL MEDICINE

## 2020-04-10 PROCEDURE — A9585 GADOBUTROL INJECTION: HCPCS | Performed by: INTERNAL MEDICINE

## 2020-04-10 PROCEDURE — 36415 COLL VENOUS BLD VENIPUNCTURE: CPT | Performed by: INTERNAL MEDICINE

## 2020-04-10 PROCEDURE — 0DBK8ZX EXCISION OF ASCENDING COLON, VIA NATURAL OR ARTIFICIAL OPENING ENDOSCOPIC, DIAGNOSTIC: ICD-10-PCS | Performed by: INTERNAL MEDICINE

## 2020-04-10 PROCEDURE — 63600000 HC DRUGS/DETAIL CODE: Performed by: ANESTHESIOLOGY

## 2020-04-10 PROCEDURE — 63700000 HC SELF-ADMINISTRABLE DRUG: Performed by: INTERNAL MEDICINE

## 2020-04-10 PROCEDURE — 86140 C-REACTIVE PROTEIN: CPT | Performed by: HOSPITALIST

## 2020-04-10 PROCEDURE — 74183 MRI ABD W/O CNTR FLWD CNTR: CPT

## 2020-04-10 PROCEDURE — 82728 ASSAY OF FERRITIN: CPT | Performed by: INTERNAL MEDICINE

## 2020-04-10 PROCEDURE — 37000002 HC ANESTHESIA MAC: Performed by: INTERNAL MEDICINE

## 2020-04-10 PROCEDURE — 75000011 HC COLONSCOPY BIOPSY: Performed by: INTERNAL MEDICINE

## 2020-04-10 PROCEDURE — 99232 SBSQ HOSP IP/OBS MODERATE 35: CPT | Performed by: INTERNAL MEDICINE

## 2020-04-10 PROCEDURE — 85027 COMPLETE CBC AUTOMATED: CPT | Performed by: INTERNAL MEDICINE

## 2020-04-10 PROCEDURE — 83550 IRON BINDING TEST: CPT | Performed by: INTERNAL MEDICINE

## 2020-04-10 PROCEDURE — 21400000 HC ROOM AND CARE CCU/INTERMEDIATE

## 2020-04-10 RX ORDER — SODIUM CHLORIDE 9 MG/ML
INJECTION, SOLUTION INTRAVENOUS CONTINUOUS
Status: DISCONTINUED | OUTPATIENT
Start: 2020-04-10 | End: 2020-04-10 | Stop reason: SDUPTHER

## 2020-04-10 RX ORDER — SODIUM CHLORIDE 9 MG/ML
INJECTION, SOLUTION INTRAVENOUS CONTINUOUS
Status: DISCONTINUED | OUTPATIENT
Start: 2020-04-10 | End: 2020-04-10

## 2020-04-10 RX ORDER — LOPERAMIDE HYDROCHLORIDE 2 MG/1
2 CAPSULE ORAL 4 TIMES DAILY PRN
Status: DISCONTINUED | OUTPATIENT
Start: 2020-04-10 | End: 2020-04-11 | Stop reason: HOSPADM

## 2020-04-10 RX ORDER — GADOBUTROL 604.72 MG/ML
0.1 INJECTION INTRAVENOUS ONCE
Status: COMPLETED | OUTPATIENT
Start: 2020-04-10 | End: 2020-04-10

## 2020-04-10 RX ORDER — HYDROCORTISONE ACETATE PRAMOXINE HCL 2.5; 1 G/100G; G/100G
CREAM TOPICAL 4 TIMES DAILY PRN
Status: DISCONTINUED | OUTPATIENT
Start: 2020-04-10 | End: 2020-04-11 | Stop reason: HOSPADM

## 2020-04-10 RX ORDER — PROPOFOL 200MG/20ML
SYRINGE (ML) INTRAVENOUS AS NEEDED
Status: DISCONTINUED | OUTPATIENT
Start: 2020-04-10 | End: 2020-04-10 | Stop reason: SURG

## 2020-04-10 RX ADMIN — SODIUM CHLORIDE: 9 INJECTION, SOLUTION INTRAVENOUS at 08:45

## 2020-04-10 RX ADMIN — GADOBUTROL 9.2 MMOL: 604.72 INJECTION INTRAVENOUS at 15:45

## 2020-04-10 RX ADMIN — PANTOPRAZOLE SODIUM 40 MG: 40 INJECTION, POWDER, LYOPHILIZED, FOR SOLUTION INTRAVENOUS at 13:14

## 2020-04-10 RX ADMIN — PROPOFOL 20 MG: 10 INJECTION, EMULSION INTRAVENOUS at 09:16

## 2020-04-10 RX ADMIN — MORPHINE SULFATE 4 MG: 4 INJECTION, SOLUTION INTRAMUSCULAR; INTRAVENOUS at 11:20

## 2020-04-10 RX ADMIN — PROPOFOL 50 MG: 10 INJECTION, EMULSION INTRAVENOUS at 09:12

## 2020-04-10 RX ADMIN — PROPOFOL 100 MG: 10 INJECTION, EMULSION INTRAVENOUS at 09:11

## 2020-04-10 RX ADMIN — SODIUM CHLORIDE: 9 INJECTION, SOLUTION INTRAVENOUS at 08:44

## 2020-04-10 RX ADMIN — PROPOFOL 50 MG: 10 INJECTION, EMULSION INTRAVENOUS at 09:13

## 2020-04-10 RX ADMIN — POTASSIUM CHLORIDE 40 MEQ: 750 TABLET, EXTENDED RELEASE ORAL at 22:24

## 2020-04-10 RX ADMIN — SODIUM CHLORIDE: 9 INJECTION, SOLUTION INTRAVENOUS at 08:48

## 2020-04-10 RX ADMIN — BARIUM SULFATE 1350 ML: 1 SUSPENSION ORAL at 13:13

## 2020-04-10 RX ADMIN — SODIUM CHLORIDE: 9 INJECTION, SOLUTION INTRAVENOUS at 11:40

## 2020-04-10 RX ADMIN — PANTOPRAZOLE SODIUM 40 MG: 40 INJECTION, POWDER, LYOPHILIZED, FOR SOLUTION INTRAVENOUS at 23:49

## 2020-04-10 NOTE — OP NOTE
_______________________________________________________________________________  Patient Name: En Gonsales         Procedure Date: 4/10/2020 8:27 AM  MRN: [id number]                     Account Number: [id number]  YOB: 1985              Age: 34  Gender: Male                          Note Status: Finalized  Attending MD: LEROY ROWELL  _______________________________________________________________________________  Procedure:            Colonoscopy  Indications:          Exclusion of Crohn's disease, Abnormal CT of the GI  tract, Diarrhea, Weight loss  Providers:            LEROY SCHAEFER (Doctor)  Referring MD:         LILY MCNALLY DO  Requesting Provider:  Medicines:            Monitored Anesthesia Care  Complications:        No immediate complications.  _______________________________________________________________________________  Procedure:            After I obtained informed consent, the scope was passed  under direct vision. Throughout the procedure, the  patient's blood pressure, pulse, and oxygen saturations  were monitored continuously. The Colonoscope was  introduced through the anus and advanced to the  terminal ileum, with identification of the appendiceal  orifice and IC valve. The colonoscopy was performed  without difficulty. The patient tolerated the procedure  well. The quality of the bowel preparation was good.  Estimated Blood Loss: Estimated blood loss: none.  Findings:  The perianal and digital rectal examinations were normal.  The terminal ileum appeared normal. Examined 20 cm proximal to ICV.  Biopsies were taken with a cold forceps for histology.  Retroflexion in the ascending colon was performed and showed no  abnormalities  Biopsies for histology were taken with a cold forceps from the ascending  colon for evaluation of microscopic colitis.  Non-bleeding internal hemorrhoids were found during retroflexion. The  hemorrhoids were  medium-sized.  No other significant abnormalities were identified in a careful  examination of the remainder of the colon.  There is no endoscopic evidence of inflammation in the entire colon.  Impression:           - The examined portion of the ileum was normal.  Biopsied for evaluation of IBD.  - Non-bleeding internal hemorrhoids.  - Biopsies were taken with a cold forceps from the  ascending colon for evaluation of microscopic colitis.  - The examination was otherwise normal.  Recommendation:       - Return patient to [facility] for ongoing care.  - Perform magnetic resonance imaging (MRI) with  gadolinium today. (MRE)  - Low residue diet. (After MRI)  - Imodium 2 tablets PO PRN after loose bowel movement.  - Await pathology results.  Procedure Code(s):    --- Professional ---  84127, Colonoscopy, flexible; with biopsy, single or  multiple  Diagnosis Code(s):    --- Professional ---  K64.8, Other hemorrhoids  R19.7, Diarrhea, unspecified  R63.4, Abnormal weight loss  R93.3, Abnormal findings on diagnostic imaging of other  parts of digestive tract  CPT copyright 2018 [employer]. All rights reserved.  The codes documented in this report are preliminary and upon  review may  be revised to meet current compliance requirements.  _____________________________  LILY HYMAN MD~YENNI  4/10/2020 9:45:04 AM  This report has been signed electronically.  Number of Addenda: 0  Note Initiated On: 4/10/2020 8:27 AM

## 2020-04-10 NOTE — ANESTHESIA POSTPROCEDURE EVALUATION
Patient: En Gonsales    Procedure Summary     Date:  04/10/20 Room / Location:   GI 1 / [facility]    Anesthesia Start:  0906 Anesthesia Stop:  0928    Procedure:  COLONOSCOPY W/ OR W/O BIOPSY (N/A Anus) Diagnosis:       Bloody diarrhea      (Bloody diarrhea [R19.7])    Provider:  Karson Velázquez MD Responsible Provider:  Job Harris MD    Anesthesia Type:  MAC ASA Status:  2          Anesthesia Type: MAC  PACU Vitals  4/10/2020 0928 - 4/10/2020 0947      4/10/2020  0945             BP:  (!) 141/74    Pulse:  81    Resp:  18    SpO2:  95 %            Anesthesia Post Evaluation    Pain management: adequate  Patient participation: complete - patient participated  Level of consciousness: awake and alert  Cardiovascular status: acceptable  Airway Patency: adequate  Respiratory status: acceptable  Hydration status: acceptable  Anesthetic complications: no

## 2020-04-10 NOTE — ANESTHESIA PREPROCEDURE EVALUATION
"Relevant Problems   CARDIOVASCULAR   (+) Hypertension      HEMATOLOGY   (+) Anemia      URINARY SYSTEM   (+) Hypokalemia   (+) Hypomagnesemia     R/o crohn's  covid negative 4/8  S/p UPPP, T&A    ROS/Med Hx     Past Surgical History:   Procedure Laterality Date   • ADENOIDECTOMY     • APPENDECTOMY     • FEM-POP BYPASS     • HERNIA REPAIR     • SHOULDER SURGERY     • TONSILLECTOMY         Physical Exam    Airway   TM distance: >3 FB   Neck ROM: full  Cardiovascular - normal   Rhythm: regular   Rate: normalPulmonary - normal   clear to auscultation  Dental - normal    Anesthesia  Exam Comments:   Exam Airway: S/p UPPP          Anesthesia Plan    Plan: MAC   ASA 2  Anesthetic plan and risks discussed with: patient  Postop Plan:   Patient Disposition: inpatient floor planned admission  Comments:    Plan: I explained that mac anesthesia means the patient will have sedation given through an IV, and the pt may become aware during the procedure, but that this is normal and expected with this \"twilight\" type anesthesia.  I will tailor the sedation to exam and clinical status, allowing deeper planes of anesthesia if tolerated while maintaining airway patency, oxygenation, and airway reflexes.        "

## 2020-04-10 NOTE — PLAN OF CARE
Problem: Adult Inpatient Plan of Care  Goal: Plan of Care Review  Outcome: Progressing  Flowsheets (Taken 4/10/2020 1801)  Progress: improving  Plan of Care Reviewed With: patient  Outcome Summary: Patient ambulating in room with steady gait; pain well controlled; Colonscopy and MRI performed today.

## 2020-04-10 NOTE — PLAN OF CARE
Problem: Adult Inpatient Plan of Care  Goal: Plan of Care Review  Outcome: Progressing  Flowsheets (Taken 4/10/2020 0552)  Progress: no change  Plan of Care Reviewed With: patient  Outcome Summary: pt was able to take colon prep until 0130 this a.m 4/10. BM was clear yellow.but had benjamin red blood hemrrhoid drainage. will try to get GI to order hemrrhoid cream. no dizziness. overnight pt does go alexy as low as 47-48.asymptomatic. no chest pain or heart palpitations.

## 2020-04-10 NOTE — ANESTHESIOLOGIST PRE-PROCEDURE ATTESTATION
Pre-Procedure Patient Identification:  I am the Primary Anesthesiologist and have identified the patient on 04/10/20 at 8:58 AM.   I have confirmed the following procedure(s) COLONOSCOPY W/ OR W/O BIOPSY will be performed by the following surgeon/proceduralist Karson Velázquez MD.

## 2020-04-10 NOTE — NURSING NOTE
Pt did not finish prep.only half pitcher left.but BM were clear yellow with bloody hemrrhoid drainage.     Told pt ok to stop prep since BM clear

## 2020-04-10 NOTE — PROGRESS NOTES
"   Hospital Medicine Service -  Daily Progress Note       SUBJECTIVE   Interval History  Patient feels a little weak today.  Denies fever.  Denies chills..  Denies chest pain.  Had a bowel preparation for colonoscopy last night and had an episode of hematochezia overnight. Brisk red blood noted by nursing staf. No loc.      OBJECTIVE      Vital signs in last 24 hours:  Temp:  [36.5 °C (97.7 °F)-37.2 °C (98.9 °F)] 36.5 °C (97.7 °F)  Heart Rate:  [70-97] 70  Resp:  [18] 18  BP: (115-162)/(55-93) 130/82    Intake/Output Summary (Last 24 hours) at 4/10/2020 1622  Last data filed at 4/10/2020 1029  Gross per 24 hour   Intake 3986 ml   Output 4 ml   Net 3982 ml       PHYSICAL EXAMINATION      Visit Vitals  /82 (BP Location: Right upper arm, Patient Position: Lying)   Pulse 70   Temp 36.5 °C (97.7 °F) (Oral)   Resp 18   Ht 1.905 m (6' 3\")   Wt 91.6 kg (202 lb)   SpO2 95%   BMI 25.25 kg/m²       General Appearance:  Alert, cooperative, no distress, appears stated age   Head:  Normocephalic, without obvious abnormality, atraumatic   Eyes:  PERRL, conjunctiva/corneas clear, EOM's intact, fundi benign, both eyes   Ears:  Normal TM's and external ear canals, both ears   Nose: Nares normal, septum midline,mucosa normal, no drainage or sinus tenderness   Throat: Lips, mucosa, and tongue normal; teeth and gums normal   Neck: Supple, symmetrical, trachea midline, no adenopathy;  thyroid: not enlarged, symmetric, no tenderness/mass/nodules; no carotid bruit or JVD   Back:   Symmetric, no curvature, ROM normal, no CVA tenderness   Lungs:   Clear to auscultation bilaterally, respirations unlabored   Breasts:  No masses or tenderness   Heart:  Regular rate and rhythm, S1 and S2 normal, no murmur, rub, or gallop   Abdomen:   Soft, non-tender, bowel sounds active all four quadrants,  no masses, no organomegaly   Pelvic: Deferred   Extremities: Extremities normal, atraumatic, no cyanosis or edema   Pulses: 2+ and symmetric   Skin: " Skin color, texture, turgor normal, no rashes or lesions   Lymph nodes: Cervical, supraclavicular, and axillary nodes normal   Neurologic: Normal        LINES, CATHETERS, DRAINS, AIRWAYS, AND WOUNDS   Lines, Drains, Airways, Wounds:  Peripheral IV 04/08/20 Right Arm (Active)   Number of days: 2       Peripheral IV 04/08/20 Left Forearm (Active)   Number of days: 2       Comments:      LABS / IMAGING / TELE      Labs  CMP Results       04/10/20 04/09/20 04/08/20                    0533 0520 2109          136 136         K 3.3 4.2 3.5         Cl 107 107 103         CO2 24 20 17         Glucose 87 118 144         BUN 8 15 19         Creatinine 1.0 0.9 1.1         Calcium 8.5 9.2 9.9         Anion Gap 6 9 16         AST -- 17 19         ALT -- 13 16         Albumin -- 4.5 5.4         EGFR >60.0 >60.0 >60.0         Comment for K at 2109 on 04/08/20:    Results obtained on plasma. Plasma Potassium values may be up to 0.4 mEQ/L less than serum values. The differences may be greater for patients with high platelet or white cell counts.          CBC Results       04/10/20 04/09/20 04/08/20                    0533 0520 2109         WBC 7.87 20.25 25.64         RBC 4.05 5.13 5.87         HGB 8.6 10.7 12.1         HCT 29.3 36.7 41.0         MCV 72.3 71.5 69.8         MCH 21.2 20.9 20.6         MCHC 29.4 29.2 29.5          555 599         Comment for HGB at 0533 on 04/10/20:  ALL RESULTS HAVE BEEN CHECKED              Imaging  Ct Abdomen Pelvis With Iv Contrast    Result Date: 4/8/2020  IMPRESSION: Suspect small bowel pathology, possible Crohn's.  Possible stricture in the mid/distal small bowel.  No bowel obstruction. Findings could be better evaluated with nonemergent MR enterography.     X-ray Chest 1 View    Result Date: 4/8/2020  IMPRESSION: No acute pulmonary process.       ECG/Telemetry: I have reviewed all ECGs.     ASSESSMENT AND PLAN      Hypomagnesemia  Assessment & Plan  Replaced this am. On electrolyte  replacement protocol    Anemia  Assessment & Plan  Hgb range 9.1-11.4 in the past 1 year   Today Hgb 8.6   Monitor daily labs    Metabolic acidosis, increased anion gap (IAG)  Assessment & Plan  Continue IV fluid hydration   Monitor BMP    Hypokalemia  Assessment & Plan  Monitor daily labs and replace as needed     04/09/20  On electrolyte replacement protocol    Hypertension  Assessment & Plan  Blood pressure within normal limits   Hold Lisinopril while NPO   IV Hydralazine PRN SBP > 160    04/09/20   bp controlled. Continue current meds      * Intractable abdominal pain  Assessment & Plan  CT scan suggestive of small bowel pathology, possible Crohn's and possible stricture in the mid/distal small bowel without bowel obstruction.  Heme-occult positive.   ED discussed case with on-call GI  Stool samples were obtained   COVID-19 test sent   Patient was initiated on IV Vanco and Zosyn   Hold on IV steroids for now  Supportive measures to include IV Morphine PRN pain and IV Zofran PRN nausea  Patient will be kept NPO  GI and ID will be consulted    04/09/20  Appreciate ID and GI consultations  Pt scheduled for colonoscopy in am. Clear liquids until midnight  Ab dc'ed    4/10/20  No antibiotics recommended for now.  Colonoscopy with no obvious findings.   Mri scheduled for today.  On ppi  Advance diet after mri             VTE Assessment: Padua    VTE Prophylaxis Plan: scd  Code Status: Full Code  Estimated Discharge Date: 4/10/2020  Disposition Planning: dc in 24-48 hrs     Lizbeth Tracey MD  4/10/2020

## 2020-04-11 VITALS
TEMPERATURE: 98.2 F | DIASTOLIC BLOOD PRESSURE: 77 MMHG | OXYGEN SATURATION: 96 % | BODY MASS INDEX: 25.12 KG/M2 | WEIGHT: 202 LBS | HEART RATE: 73 BPM | RESPIRATION RATE: 18 BRPM | HEIGHT: 75 IN | SYSTOLIC BLOOD PRESSURE: 138 MMHG

## 2020-04-11 LAB
ANION GAP SERPL CALC-SCNC: 7 MEQ/L (ref 3–15)
BUN SERPL-MCNC: 8 MG/DL (ref 8–20)
CALCIUM SERPL-MCNC: 8.9 MG/DL (ref 8.9–10.3)
CHLORIDE SERPL-SCNC: 108 MEQ/L (ref 98–109)
CO2 SERPL-SCNC: 23 MEQ/L (ref 22–32)
CREAT SERPL-MCNC: 0.9 MG/DL (ref 0.8–1.3)
ERYTHROCYTE [DISTWIDTH] IN BLOOD BY AUTOMATED COUNT: 16.9 % (ref 11.6–14.4)
GFR SERPL CREATININE-BSD FRML MDRD: >60 ML/MIN/1.73M*2
GLUCOSE SERPL-MCNC: 90 MG/DL (ref 70–99)
HCT VFR BLDCO AUTO: 31.9 % (ref 40.1–51)
HGB BLD-MCNC: 9 G/DL (ref 13.7–17.5)
MCH RBC QN AUTO: 20.6 PG (ref 28–33.2)
MCHC RBC AUTO-ENTMCNC: 28.2 G/DL (ref 32.2–36.5)
MCV RBC AUTO: 73.2 FL (ref 83–98)
PDW BLD AUTO: 10.9 FL (ref 9.4–12.4)
PLATELET # BLD AUTO: 384 K/UL (ref 150–350)
POTASSIUM SERPL-SCNC: 3.6 MEQ/L (ref 3.6–5.1)
RBC # BLD AUTO: 4.36 M/UL (ref 4.5–5.8)
SODIUM SERPL-SCNC: 138 MEQ/L (ref 136–144)
WBC # BLD AUTO: 6.2 K/UL (ref 3.8–10.5)

## 2020-04-11 PROCEDURE — 99239 HOSP IP/OBS DSCHRG MGMT >30: CPT | Performed by: INTERNAL MEDICINE

## 2020-04-11 PROCEDURE — 85027 COMPLETE CBC AUTOMATED: CPT | Performed by: INTERNAL MEDICINE

## 2020-04-11 PROCEDURE — 25000000 HC PHARMACY GENERAL: Performed by: HOSPITALIST

## 2020-04-11 PROCEDURE — 80048 BASIC METABOLIC PNL TOTAL CA: CPT | Performed by: INTERNAL MEDICINE

## 2020-04-11 PROCEDURE — 63700000 HC SELF-ADMINISTRABLE DRUG: Performed by: INTERNAL MEDICINE

## 2020-04-11 PROCEDURE — 25800000 HC PHARMACY IV SOLUTIONS: Performed by: HOSPITALIST

## 2020-04-11 PROCEDURE — 36415 COLL VENOUS BLD VENIPUNCTURE: CPT | Performed by: INTERNAL MEDICINE

## 2020-04-11 RX ORDER — FERROUS SULFATE 325(65) MG
325 TABLET ORAL 2 TIMES DAILY WITH MEALS
Qty: 60 TABLET | Refills: 0 | Status: SHIPPED | OUTPATIENT
Start: 2020-04-11 | End: 2023-09-26

## 2020-04-11 RX ORDER — FERROUS SULFATE 325(65) MG
325 TABLET ORAL 2 TIMES DAILY WITH MEALS
Status: DISCONTINUED | OUTPATIENT
Start: 2020-04-11 | End: 2020-04-11 | Stop reason: HOSPADM

## 2020-04-11 RX ORDER — LOPERAMIDE HYDROCHLORIDE 2 MG/1
2 CAPSULE ORAL 4 TIMES DAILY PRN
Qty: 30 CAPSULE | Refills: 0 | Status: SHIPPED | OUTPATIENT
Start: 2020-04-11 | End: 2023-09-26

## 2020-04-11 RX ORDER — HYDROCORTISONE ACETATE PRAMOXINE HCL 2.5; 1 G/100G; G/100G
CREAM TOPICAL 4 TIMES DAILY PRN
Qty: 30 G | Refills: 0 | Status: SHIPPED | OUTPATIENT
Start: 2020-04-11 | End: 2023-09-26

## 2020-04-11 RX ORDER — PANTOPRAZOLE SODIUM 40 MG/1
40 TABLET, DELAYED RELEASE ORAL DAILY
Qty: 30 TABLET | Refills: 0 | Status: SHIPPED | OUTPATIENT
Start: 2020-04-11 | End: 2023-09-26

## 2020-04-11 RX ADMIN — PANTOPRAZOLE SODIUM 40 MG: 40 INJECTION, POWDER, LYOPHILIZED, FOR SOLUTION INTRAVENOUS at 11:38

## 2020-04-11 RX ADMIN — FERROUS SULFATE TAB 325 MG (65 MG ELEMENTAL FE) 325 MG: 325 (65 FE) TAB at 07:55

## 2020-04-11 RX ADMIN — SODIUM CHLORIDE: 9 INJECTION, SOLUTION INTRAVENOUS at 09:14

## 2020-04-11 NOTE — DISCHARGE SUMMARY
[facility] Medicine Service -  Inpatient Discharge Summary        BRIEF OVERVIEW   Admitting Provider: Elsie Callahan MD  Attending Provider: Lizbeth Fernandes* Attending phys phone: [phone]    PCP: Karson Rai [phone]    Admission Date: 4/8/2020  Discharge Date: 4/11/2020     DISCHARGE DIAGNOSES      Primary Discharge Diagnosis  Intractable abdominal pain    Secondary Discharge Diagnoses  Active Hospital Problems    Diagnosis Date Noted   • Iron deficiency anemia 04/10/2020   • Hypertension 04/09/2020   • Hypokalemia 04/09/2020   • Metabolic acidosis, increased anion gap (IAG) 04/09/2020   • Anemia 04/09/2020   • Hypomagnesemia 04/09/2020   • Intractable abdominal pain 04/08/2020   • Bloody diarrhea 04/08/2020      Resolved Hospital Problems   No resolved problems to display.       Problem List on Day of Discharge  Iron deficiency anemia  Assessment & Plan  hgb 8.3 today. Will order iron studies.   Cbc in am    CBC stable. Iron studies showing Iron deficiency anemia.   The pt has been started on iron. No signs of bleeding noted.      Hypomagnesemia  Assessment & Plan  Replaced this am. On electrolyte replacement protocol    Normalized prior to his dc    Anemia  Assessment & Plan  Hgb range 9.1-11.4 in the past 1 year   Today Hgb 12.1, perhaps heme-concentrated   Monitor daily labs    hgb stable    Metabolic acidosis, increased anion gap (IAG)  Assessment & Plan  Continue IV fluid hydration   Monitor BMP    resolved    Hypokalemia  Assessment & Plan  Monitor daily labs and replace as needed     04/09/20  On electrolyte replacement protocol    Hypertension  Assessment & Plan  Blood pressure within normal limits   Hold Lisinopril while NPO   IV Hydralazine PRN SBP > 160    04/09/20   bp controlled. Continue current meds      * Intractable abdominal pain  Assessment & Plan  CT scan suggestive of small bowel pathology, possible Crohn's and possible stricture in the mid/distal small  "bowel without bowel obstruction.  Heme-occult positive.   ED discussed case with on-call GI  Stool samples were obtained   COVID-19 test sent   Patient was initiated on IV Vanco and Zosyn   Hold on IV steroids for now  Supportive measures to include IV Morphine PRN pain and IV Zofran PRN nausea  Patient will be kept NPO  GI and ID will be consulted    04/09/20  Appreciate ID and GI consultations  Pt scheduled for colonoscopy in am. Clear liquids until midnight  Ab MN'ed          SUMMARY OF HOSPITALIZATION      Presenting Problem/History of Present Illness  Intractable abdominal pain    This is a 34 y.o. year-old male admitted on 4/8/2020 with Cough [R05]  Generalized abdominal pain [R10.84]  Lower GI bleed [K92.2]  Bloody diarrhea [R19.7]  Intractable abdominal pain [R10.9]  Non-intractable vomiting with nausea, unspecified vomiting type [R11.2].        Hospital Course    The pt is a 35 yo male with past hx of HTN, Bipolar dz, who came into the ED complaining of bbrpr, diarrhea, abd pain and vomiting. ID consultedI so was GI. Colonoscopy was done w no significant findings. The pt also underwent an MRI. Results are below. Stool studies showed no bacteria, parasites. C diff was negative.   The pt had some abdominal pain after the procedure. No further bleeding was noted. As per GI recommendations no further intervention was deemed necessary. The pt was discharged home in stable conditions. He is advised to follow up with DR. Velázquez in 1 week. The pt did have some hemorrhoidal bleed while in the hospital with a small amount of blood noted.     Exam on Day of Discharge  Visit Vitals  /77 (BP Location: Left upper arm, Patient Position: Lying)   Pulse 73   Temp 36.8 °C (98.2 °F) (Temporal)   Resp 18   Ht 1.905 m (6' 3\")   Wt 91.6 kg (202 lb)   SpO2 96%   BMI 25.25 kg/m²       General Appearance:  Alert, cooperative, no distress, appears stated age   Head:  Normocephalic, without obvious abnormality, atraumatic "   Eyes:  PERRL, conjunctiva/corneas clear, EOM's intact, fundi benign, both eyes   Ears:  Normal TM's and external ear canals, both ears   Nose: Nares normal, septum midline, mucosa normal, no drainage or sinus tenderness   Throat: Lips, mucosa, and tongue normal; teeth and gums normal   Neck: Supple, symmetrical, trachea midline, no adenopathy, thyroid: not enlarged, symmetric, no tenderness/mass/nodules, no carotid bruit or JVD   Back:   Symmetric, no curvature, ROM normal, no CVA tenderness   Lungs:   Clear to auscultation bilaterally, respirations unlabored   Chest Wall:  No tenderness or deformity   Heart:  Regular rate and rhythm, S1, S2 normal, no murmur, rub or gallop   Abdomen:   Soft, non-tender, bowel sounds active all four quadrants,  no masses, no organomegaly   Genitalia:  Normal male   Rectal:  Normal tone, normal prostate, no masses or tenderness;  guaiac negative stool   Extremities: Extremities normal, atraumatic, no cyanosis or edema   Pulses: 2+ and symmetric   Skin: Skin color, texture, turgor normal, no rashes or lesions   Lymph nodes: Cervical, supraclavicular, and axillary nodes normal   Neurologic: Normal       Consults During Admission  IP CONSULT TO GASTROENTEROLOGY  IP CONSULT TO INFECTIOUS DISEASE    DISCHARGE MEDICATIONS        Medication List      START taking these medications    ferrous sulfate 325 mg (65 mg iron) tablet  Take 1 tablet (325 mg total) by mouth 2 (two) times a day with meals.  Dose:  325 mg     hydrocortisone-pramoxine 2.5-1 % rectal cream  Commonly known as:  ANALPRAM-HC  Insert into the rectum 4 (four) times a day as needed for hemorrhoids for up to 10 days.     loperamide 2 mg capsule  Commonly known as:  IMODIUM  Take 1 capsule (2 mg total) by mouth 4 (four) times a day as needed for diarrhea for up to 10 days.  Dose:  2 mg     pantoprazole 40 mg EC tablet  Commonly known as:  PROTONIX  Take 1 tablet (40 mg total) by mouth daily.  Dose:  40 mg        CONTINUE  taking these medications    dicyclomine 20 mg tablet  Commonly known as:  BENTYL  Take 1 tablet (20 mg total) by mouth 2 (two) times a day.  Dose:  20 mg     lisinopriL 10 mg tablet  Commonly known as:  PRINIVIL  Take 10 mg by mouth once daily.  Dose:  10 mg     ondansetron ODT 4 mg disintegrating tablet  Commonly known as:  ZOFRAN ODT  Take 1 tablet (4 mg total) by mouth every 8 (eight) hours as needed for nausea or vomiting for up to 7 days.  Dose:  4 mg        STOP taking these medications    famotidine 40 mg tablet  Commonly known as:  PEPCID                   PROCEDURES / LABS / IMAGING      Operative Procedures  colonoscopy    Other Procedures  Mri enterography    Pertinent Labs  CBC Results       04/11/20 04/10/20 04/09/20                    0530 0533 0520         WBC 6.20 7.87 20.25         RBC 4.36 4.05 5.13         HGB 9.0 8.6 10.7         HCT 31.9 29.3 36.7         MCV 73.2 72.3 71.5         MCH 20.6 21.2 20.9         MCHC 28.2 29.4 29.2          373 555         Comment for HGB at 0533 on 04/10/20:  ALL RESULTS HAVE BEEN CHECKED        basic metabolic panel  BMP Results       04/11/20 04/10/20 04/09/20                    0530 0533 0520          137 136         K 3.6 3.3 4.2         Cl 108 107 107         CO2 23 24 20         Glucose 90 87 118         BUN 8 8 15         Creatinine 0.9 1.0 0.9         Calcium 8.9 8.5 9.2         Anion Gap 7 6 9         EGFR >60.0 >60.0 >60.0                         Pertinent Imaging  Ct Abdomen Pelvis With Iv Contrast    Result Date: 4/8/2020  IMPRESSION: Suspect small bowel pathology, possible Crohn's.  Possible stricture in the mid/distal small bowel.  No bowel obstruction. Findings could be better evaluated with nonemergent MR enterography.     X-ray Chest 1 View    Result Date: 4/8/2020  IMPRESSION: No acute pulmonary process.     Mri Enterography With Without Contrast    Result Date: 4/11/2020  IMPRESSION: There is mild wall thickening in the medial  enhancement of the distal ileum, possible inflammatory process such as Crohn's.  No convincing stricture or fistula.  No collection.       OUTPATIENT  FOLLOW-UP / REFERRALS / PENDING TESTS        Outpatient Follow-Up Appointments  Encounter Information     You do not currently have any appointments scheduled.      follow up with Dr. Velázquez in 1 week.     Referrals  No orders of the defined types were placed in this encounter.      Test Results Pending at Discharge  Unresulted Labs (From admission, onward)    None          Important Issues to Address in Follow-Up  none    DISCHARGE DISPOSITION      Disposition: Home     Code Status At Discharge: Full Code    Physician Order for Life-Sustaining Treatment Document Status      No documents found

## 2020-04-11 NOTE — PROGRESS NOTES
4/11/202011:49 AM spoke to [facility] pt for discharge today, per chart review pt does not have any dc needs at this time. Plan is for dc home today. Candy Carlson LSW [id number]

## 2020-04-11 NOTE — NURSING NOTE
Order received for patient to be discharged home. IV taken out and heart monitor returned to monitor room. Discharge instructions printed, given and reviewed with patient. All questions asked and answered. Will wheel to lobby when ready for d/c.

## 2020-04-11 NOTE — PROGRESS NOTES
"  GASTROENTEROLOGY DAILY PROGRESS NOTE  [facility]     PATIENT NAME:  En Gonsales          YOB: 1985  AGE:  34 y.o.   MRN: [id number]      SUBJECTIVE   One soft/loose stool yesterday.  Tolerating po.  Pain improved but not gone.    REVIEW OF SYSTEMS   13 point ROS unchanged    VITAL SIGNS   Height: 1.905 m (6' 3\") Weight: 91.6 kg (202 lb) Body mass index is 25.25 kg/m².  Vitals:    04/10/20 2224 04/11/20 0533 04/11/20 0700 04/11/20 0754   BP: 126/80 117/61  128/63   BP Location: Left upper arm Left upper arm  Left upper arm   Patient Position: Lying Lying  Lying   Pulse: 75 61 78 78   Resp: 18 18 18   Temp: 37 °C (98.6 °F) 37 °C (98.6 °F)  37.4 °C (99.4 °F)   TempSrc: Oral Oral  Temporal   SpO2: 98% 97%  98%   Weight:       Height:         PHYSICAL EXAM   Physical Exam  General appearance: alert, appears stated age and cooperative  Head: normocephalic  Lungs: nonlabored breathing   Extremities: no edema  Skin: No jaundice  Neurologic: awake and alert      IMAGING AND LABS REVIEWED     Results from last 7 days   Lab Units 04/11/20  0530 04/10/20  0533 04/09/20  0520 04/08/20  2109   WBC K/uL 6.20 7.87 20.25* 25.64*   HEMOGLOBIN g/dL 9.0* 8.6* 10.7* 12.1*   HEMATOCRIT % 31.9* 29.3* 36.7* 41.0   PLATELETS K/uL 384* 373* 555* 599*   ]  Results from last 7 days   Lab Units 04/11/20  0530 04/10/20  0533 04/09/20  0520 04/08/20  2109   SODIUM mEQ/L 138 137 136 136   POTASSIUM mEQ/L 3.6 3.3* 4.2 3.5*   CHLORIDE mEQ/L 108 107 107 103   CO2 mEQ/L 23 24 20* 17*   BUN mg/dL 8 8 15 19   CREATININE mg/dL 0.9 1.0 0.9 1.1   CALCIUM mg/dL 8.9 8.5* 9.2 9.9   ALBUMIN g/dL  --   --  4.5 5.4*   BILIRUBIN TOTAL mg/dL  --   --  0.9 1.1   ALK PHOS IU/L  --   --  44 59   ALT IU/L  --   --  13* 16   AST IU/L  --   --  17 19   GLUCOSE mg/dL 90 87 118* 144*   ]    ]    ]    Ct Abdomen Pelvis With Iv Contrast    Result Date: 4/8/2020  Narrative: CLINICAL HISTORY: lower abd pain; vomiting; diarrhea TECHNIQUE: Helical acquisition " of the abdomen and pelvis after administration of intravenous   125 cc of Omnipaque 350. Oral contrast also administered. Delayed images through the abdomen also performed. Coronal and sagittal reformats also performed. CT DOSE:  One or more dose reduction techniques (e.g. automated exposure control, adjustment of the mA and/or kV according to patient size, use of iterative reconstruction technique) utilized for this examination COMPARISON: CT abdomen and pelvis 2/27/2020 FINDINGS: -Lower chest: Within normal limits. -Liver: No mass or any significant abnormality. -Gallbladder: No calcified gallstones. -Bile Ducts: No significant biliary ductal dilatation. -Spleen:  No splenomegaly or focal lesion. -Pancreas: No mass, ductal dilatation, or inflammatory changes. -Kidneys: No solid mass or hydronephrosis, or any definite calculi. -Adrenals:  No nodules. -Lymph Nodes: No adenopathy. -Vascular: Right common iliac and right external iliac artery is absent, no change.  Reconstitution of the right femoral artery. -Abdominal Wall: Postsurgical changes right inguinal hernia repair. -Bones: No acute findings BOWEL/MESENTERY: No bowel dilatation, although some loops of small bowel are relatively distended and of the loops are decompressed..  There is a recanalized segment of the distal small bowel with relative abrupt narrowing (image 145 series 1). Question of stricture of a loop of mid/distal small bowel (image 46 on the coronal images. Fatty deposition in the wall of the terminal ileum. Appendix is absent. PELVIS: -Bladder: Unremarkable -Reproductive Organs:No evidence of a mass. -Lymph Nodes: Within normal limits. -Miscellaneous: No free fluid     Impression: IMPRESSION: Suspect small bowel pathology, possible Crohn's.  Possible stricture in the mid/distal small bowel.  No bowel obstruction. Findings could be better evaluated with nonemergent MR enterography.     X-ray Chest 1 View    Result Date: 4/8/2020  Narrative:  CLINICAL HISTORY: cough/SOB TECHNIQUE: Frontal chest radiograph. COMPARISON: CT of the chest 2/19/2016 FINDINGS: Lungs are clear. There is no pneumothorax. Cardiac silhouette within normal limits. There is no vascular congestion.     Impression: IMPRESSION: No acute pulmonary process.     Mri Enterography With Without Contrast    Result Date: 4/11/2020  Narrative: CLINICAL HISTORY: Crohn dz suspected, non-acute abd pain/cramping COMPARISON: CT of the abdomen and pelvis 4/8/2020 TECHNIQUE: 1.5 Jihan magnet.  Multiplanar, multisequence MRI of the abdomen without and with intravenous contrast.  Patient received 9 mL of Gadavist intravenous contrast.  Patient also drank approximately 1350 mL of Volumen. FINDINGS: The bowel is normal in caliber.  Mild thickening and mild mural enhancement of the distal ileum (for instance image 46 series 24).  Associated likely a luminal narrowing.  No definite stricture.  Otherwise, no convincing of bowel wall thickening or abnormal enhancement. There is no evidence of a fistula.  No collection. No adenopathy.  No free fluid. Included liver and spleen within normal limits.  No cholelithiasis. No hydronephrosis.  Adrenal glands within normal limits pancreas within normal limits. Absence of the right common iliac artery. Bone marrow signal characteristics within normal limits.     Impression: IMPRESSION: There is mild wall thickening in the medial enhancement of the distal ileum, possible inflammatory process such as Crohn's.  No convincing stricture or fistula.  No collection.       ASSESSMENT/PLAN   1. Abdo pain, diarrhea, nausea, rectal bleeding - C diff neg, stool C+S in progress.  Has had chronic GI symptoms - unclear whether this represents Crohn's or not, although imaging is equivocal.    2. ? Abnormal imaging - CT showed possible SB abnormality.  MR-enterography revealed mild wall thickening in the medial enhancement of the distal ileum, possible inflammatory process such as  Crohn's. Colnoscopy, however, was normal including the distal 20 cm of ileum. CRP nl.  Inflammatory bowel disease is a consideration, but seems less likely given the colonoscopy findings and normal CRP.  3. Bipolar  4. Marijuana use - ? Cannabis hyperemesis  5. Leukocytosis - now normal.    Rec:  OK for discharge from GI standpoint.  Pt advised to f/u with Dr Velázquez for Telemedicine visit next week.  Please call again if needed.             AUTHOR:  Anthony Rodriguez MD  4/11/2020

## 2020-04-12 LAB — BACTERIA STL CULT: NORMAL

## 2020-04-13 LAB
CASE RPRT: NORMAL
CLINICAL INFO: NORMAL
PATH REPORT.FINAL DX SPEC: NORMAL
PATH REPORT.GROSS SPEC: NORMAL

## 2020-04-14 LAB
BACTERIA BLD CULT: NORMAL
BACTERIA BLD CULT: NORMAL

## 2020-07-16 ENCOUNTER — OFFICE VISIT (OUTPATIENT)
Dept: GASTROENTEROLOGY | Facility: HOSPITAL | Age: 35
End: 2020-07-16
Payer: COMMERCIAL

## 2020-07-16 VITALS
OXYGEN SATURATION: 96 % | DIASTOLIC BLOOD PRESSURE: 76 MMHG | HEART RATE: 77 BPM | BODY MASS INDEX: 26.59 KG/M2 | TEMPERATURE: 97.3 F | SYSTOLIC BLOOD PRESSURE: 140 MMHG | RESPIRATION RATE: 18 BRPM | WEIGHT: 213.9 LBS | HEIGHT: 75 IN

## 2020-07-16 DIAGNOSIS — D50.9 IRON DEFICIENCY ANEMIA, UNSPECIFIED IRON DEFICIENCY ANEMIA TYPE: Primary | ICD-10-CM

## 2020-07-16 DIAGNOSIS — K64.8 INTERNAL HEMORRHOIDS: ICD-10-CM

## 2020-07-16 DIAGNOSIS — K92.1 HEMATOCHEZIA: ICD-10-CM

## 2020-07-16 DIAGNOSIS — R10.30 LOWER ABDOMINAL PAIN: ICD-10-CM

## 2020-07-16 PROBLEM — R10.9 ABDOMINAL PAIN: Status: ACTIVE | Noted: 2020-07-16

## 2020-07-16 RX ORDER — LIDOCAINE HYDROCHLORIDE 20 MG/ML
5-10 SOLUTION OROPHARYNGEAL 2 TIMES DAILY PRN
Qty: 100 ML | Refills: 3 | Status: SHIPPED | OUTPATIENT
Start: 2020-07-16 | End: 2021-07-16

## 2020-07-16 RX ORDER — LIDOCAINE HYDROCHLORIDE 20 MG/ML
5-10 SOLUTION OROPHARYNGEAL 2 TIMES DAILY PRN
Qty: 100 ML | Refills: 3 | Status: SHIPPED | OUTPATIENT
Start: 2020-07-16 | End: 2020-07-16 | Stop reason: SDUPTHER

## 2020-07-16 ASSESSMENT — ENCOUNTER SYMPTOMS
BLOOD IN STOOL: 1
CONSTIPATION: 1
APPETITE CHANGE: 0
NAUSEA: 0
ACTIVITY CHANGE: 0
FATIGUE: 0
VOMITING: 0
ABDOMINAL PAIN: 1

## 2020-07-16 ASSESSMENT — PAIN SCALES - GENERAL: PAINLEVEL: 0-NO PAIN

## 2020-07-16 NOTE — PROGRESS NOTES
"   Gastroenterology Clinic Note       SUBJECTIVE   En Gonsales is a 34 y.o. year-old male with a past medical history of bipolar, HTN, R inguinal hernia repair, appendectomy who presents as a new patient to us (previously seen by Dr. Velázquez but insurance changed through [facility]) for f/u of ALEXANDER and rectal bleeding/hospitalization April 2020.    Per patient since illness in 2016 his bowel habits have always been \"off\". Per records he was diagnosed and admitted in 2016 for adenovirus PNA and since that time he describe infrequent but intermittent loose stool and abdominal pain with increasing frequency of BRBPR as well as belching \"sulfa\". He was admitted 4/8-4/11 for these symptoms. VSS. CBC with ALEXANDER (hgb 8.6-->10, MCV 73, iron 10, sat 3, ferritin 8) BMP OK, CRP 7.8 and then normalized, stool cultures, stool o&p, cdiff negative. CT A/P with IV contrast: possible stricture in mid/distal small bowel c/f crohns. MRE with wall thickening in distal ileum w/o convincing stricture. Colonoscopy performed during that admission was to the TI advanced 20cm past ICV with normal biopsies of TI and ascending colon, otherwise showed nonbleeding internal hemorrhoids. He was discharged with iron, loperamide, PPI, bentyl, zofran. He f/u with Dr. Velázquez via telemed 4/27 and reported improved nausea, abdominal pain, diarrhea but still with some red blood in stools. They discussed w/u his ALEXANDER once COVID situation improved and ordering a celiac panel for ALEXANDER.    Since that time he states his major complaint is continued profuse rectal bleeding making it hard/him hesitant to have good bowel movements. He states he'll sit on toilet several times and only have small volume bowel movements 2/2 bleeding and hesitancy to void completely. Once he does evacuate completely any abdominal discomfort is relieved. He states he is having BRB every day filling the toilet bowel. He continues to take oral iron.        REVIEW OF SYSTEMS   Review of " "Systems   Constitutional: Negative for activity change, appetite change and fatigue.   Gastrointestinal: Positive for abdominal pain, blood in stool and constipation. Negative for nausea and vomiting.        MEDICATIONS        Current Outpatient Medications:   •  lisinopriL (PRINIVIL) 10 mg tablet, Take 10 mg by mouth once daily., Disp: , Rfl:   •  dicyclomine (BENTYL) 20 mg tablet, Take 1 tablet (20 mg total) by mouth 2 (two) times a day. (Patient not taking: Reported on 7/16/2020 ), Disp: 20 tablet, Rfl: 0  •  ferrous sulfate 325 mg (65 mg iron) tablet, Take 1 tablet (325 mg total) by mouth 2 (two) times a day with meals., Disp: 60 tablet, Rfl: 0  •  hydrocortisone-pramoxine (ANALPRAM-HC) 2.5-1 % rectal cream, Insert into the rectum 4 (four) times a day as needed for hemorrhoids for up to 10 days., Disp: 30 g, Rfl: 0  •  lidocaine HCL (lidocaine) 2 % solution mucosal solution, Apply 5-10 mL topically 2 (two) times a day as needed (rectal pain)., Disp: 100 mL, Rfl: 3  •  loperamide (IMODIUM) 2 mg capsule, Take 1 capsule (2 mg total) by mouth 4 (four) times a day as needed for diarrhea for up to 10 days., Disp: 30 capsule, Rfl: 0  •  ondansetron ODT (ZOFRAN ODT) 4 mg disintegrating tablet, Take 1 tablet (4 mg total) by mouth every 8 (eight) hours as needed for nausea or vomiting for up to 7 days., Disp: 12 tablet, Rfl: 0  •  pantoprazole (PROTONIX) 40 mg EC tablet, Take 1 tablet (40 mg total) by mouth daily., Disp: 30 tablet, Rfl: 0    PHYSICAL EXAMINATION   Vital signs:   Visit Vitals  /76 (BP Location: Left upper arm, Patient Position: Sitting)   Pulse 77   Temp 36.3 °C (97.3 °F) (Oral)   Resp 18   Ht 1.905 m (6' 3\")   Wt 97 kg (213 lb 14.4 oz)   SpO2 96%   BMI 26.74 kg/m²     Physical Exam   Constitutional: He is oriented to person, place, and time. No distress.   Pulmonary/Chest: No respiratory distress.   Abdominal: Soft. Bowel sounds are normal.   Neurological: He is alert and oriented to person, place, " and time.        LABS / IMAGING/STUDIES      Labs Reviewed:   Lab Results   Component Value Date    GLUCOSE 90 04/11/2020    CALCIUM 8.9 04/11/2020     04/11/2020    K 3.6 04/11/2020    CO2 23 04/11/2020     04/11/2020    BUN 8 04/11/2020    CREATININE 0.9 04/11/2020     Lab Results   Component Value Date    WBC 6.20 04/11/2020    HGB 9.0 (L) 04/11/2020    HCT 31.9 (L) 04/11/2020    MCV 73.2 (L) 04/11/2020     (H) 04/11/2020     No results found for: HEPCAB    Studies/Imaging Reviewed: per HPI      GI Testing      Colonoscopy: 4/2020, negative TI and right colon biopsies. itnernal hemorrhoids.  EGD:  Hepatitis C antibody:   ASSESSMENT AND PLAN   Problem List Items Addressed This Visit        Nervous    Abdominal pain    Current Assessment & Plan     A/w bowel movements started after adenovirus dx in 2016. 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   Colonoscopy 4/2020 unremarkable.    - please see plan for hematochezia  - if persists despite adequate bowel regimen and treatment of hemorrhoids will discuss further symptomatic management at next visit            Hematologic    Iron deficiency anemia - Primary    Relevant Orders    Celiac reflex panel    IgA    CBC    Iron and TIBC    Ferritin       Other    Hematochezia    Current Assessment & Plan     BRBPR exacerbated by straining with prolapse.  Internal hemorrhoids seen on inpatient colonoscopy 4/2020 also with ALEXANDER.  Persistent, symptomatic    - have referred to CRC at today's visit as I suspect the majority of his complaints are related to bleeding hemorrhoids including ALEXANDER and hesistancy to have BMS  - after this he can f/u with me and we will recheck CBC and iron studies and if improving no role for further w/u if remains ALEXANDER can pursue EGD and possible VCE if that is normal  - check CBC and iron studies at todays  visit  - c/w iron therapy  - miralax with titration to ensure loose stools  - topical lidocaine for symptomatic relief           Other Visit Diagnoses     Internal hemorrhoids        Relevant Orders    Ambulatory referral to Colorectal Surgery           Stacy Villatoro MD  07/16/20  3:10 PM

## 2020-07-16 NOTE — PATIENT INSTRUCTIONS
Today we discussed several issues. We agreed we should prioritize managing your hemorrhoids as they are likely causing a majority of your issues including, bleeding, anemia and abdominal discomfort.    I have provided you a referral for colorectal surgery. Please plan to see then as soon as you are able to discuss treatment.    In the meantime start MIRALAX (polyethylene glycol). You can titrate this as we discussed to a soft loose bowel movement 1-2x a day to prevent straining and exacerbating your hemorrhoids. You can also use topical lidocaine for discomfort.    Please recheck you blood counts, iron studies and celiac panel.    Once your hemorrhoids have been managed please come back to see me and we will decide on further testing if your anemia is not improved. Things to consider will be an upper endoscopy and video capsule test.

## 2020-07-16 NOTE — ASSESSMENT & PLAN NOTE
A/w bowel movements started after adenovirus dx in 2016. 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   Colonoscopy 4/2020 unremarkable.    - please see plan for hematochezia  - if persists despite adequate bowel regimen and treatment of hemorrhoids will discuss further symptomatic management at next visit

## 2020-07-16 NOTE — ASSESSMENT & PLAN NOTE
BRBPR exacerbated by straining with prolapse.  Internal hemorrhoids seen on inpatient colonoscopy 4/2020 also with ALEXANDER.  Persistent, symptomatic    - have referred to CRC at today's visit as I suspect the majority of his complaints are related to bleeding hemorrhoids including ALEXANDER and hesistancy to have BMS  - after this he can f/u with me and we will recheck CBC and iron studies and if improving no role for further w/u if remains ALEXANDER can pursue EGD and possible VCE if that is normal  - check CBC and iron studies at todays visit  - c/w iron therapy  - miralax with titration to ensure loose stools  - topical lidocaine for symptomatic relief

## 2020-07-22 LAB
ERYTHROCYTE [DISTWIDTH] IN BLOOD BY AUTOMATED COUNT: 14.9 % (ref 11.6–15.4)
HCT VFR BLD AUTO: 36.2 % (ref 37.5–51)
HGB BLD-MCNC: 11.7 G/DL (ref 13–17.7)
MCH RBC QN AUTO: 27.1 PG (ref 26.6–33)
MCHC RBC AUTO-ENTMCNC: 32.3 G/DL (ref 31.5–35.7)
MCV RBC AUTO: 84 FL (ref 79–97)
PLATELET # BLD AUTO: 324 X10E3/UL (ref 150–450)
RBC # BLD AUTO: 4.32 X10E6/UL (ref 4.14–5.8)
WBC # BLD AUTO: 6.1 X10E3/UL (ref 3.4–10.8)

## 2020-07-23 LAB
FERRITIN SERPL-MCNC: 6 NG/ML (ref 30–400)
IGA SERPL-MCNC: 210 MG/DL (ref 90–386)
IRON SATN MFR SERPL: 5 % (ref 15–55)
IRON SERPL-MCNC: 16 UG/DL (ref 38–169)
TIBC SERPL-MCNC: 344 UG/DL (ref 250–450)
TTG IGA SER-ACNC: <2 U/ML (ref 0–3)
UIBC SERPL-MCNC: 328 UG/DL (ref 111–343)

## 2020-07-30 ENCOUNTER — OFFICE VISIT (OUTPATIENT)
Dept: SURGERY | Facility: CLINIC | Age: 35
End: 2020-07-30
Payer: COMMERCIAL

## 2020-07-30 VITALS
DIASTOLIC BLOOD PRESSURE: 70 MMHG | SYSTOLIC BLOOD PRESSURE: 110 MMHG | WEIGHT: 215 LBS | HEIGHT: 75 IN | BODY MASS INDEX: 26.73 KG/M2 | HEART RATE: 83 BPM

## 2020-07-30 DIAGNOSIS — K64.1 GRADE II HEMORRHOIDS: Primary | ICD-10-CM

## 2020-07-30 PROCEDURE — 46221 LIGATION OF HEMORRHOID(S): CPT | Performed by: COLON & RECTAL SURGERY

## 2020-07-30 PROCEDURE — 99243 OFF/OP CNSLTJ NEW/EST LOW 30: CPT | Mod: 25 | Performed by: COLON & RECTAL SURGERY

## 2020-07-30 ASSESSMENT — ENCOUNTER SYMPTOMS
BLOOD IN STOOL: 1
ANAL BLEEDING: 1
ABDOMINAL DISTENTION: 1
ABDOMINAL PAIN: 1
FATIGUE: 1

## 2020-07-30 NOTE — LETTER
Dear Marco A,    I saw En Gonsales in the office today in consultation. Please see my note below.    If you have any additional questions, please do not hesitate to call me.    Sincerely,    Checo Larkin MD      _____________________________________      Colon and Rectal Surgery Consult    Subjective     En Gonsales is a 34 y.o. male who is referred for consultation by Dr. Karson Mendez for bleeding internal hemorrhoids.    He has had significant anal bleeding with bowel movements including blood dripping the toilet bowl.  This is been going on for about 1 year.  He has blood filling the toilet bowl and he is gotten anemic down to a dyllan hemoglobin of 8.6.  His hemoglobin has rebounded and most recently was 11.7.  He does have discomfort after bowel movements but improves when he manually reduces the prolapsing hemorrhoids.  He does not have sharp pains with bowel movements.  Of note, his brother is scheduled for hemorrhoid surgery soon.    Medical History:   Past Medical History:   Diagnosis Date   • GERD (gastroesophageal reflux disease)    • Hypertension    • Migraine        Surgical History:   Past Surgical History:   Procedure Laterality Date   • ADENOIDECTOMY     • APPENDECTOMY     • FEM-POP BYPASS     • HERNIA REPAIR     • SHOULDER SURGERY     • TONSILLECTOMY         Social History:   Social History     Tobacco Use   • Smoking status: Former Smoker     Types: Cigarettes   • Smokeless tobacco: Never Used   • Tobacco comment: Quit 1 year ago    Substance Use Topics   • Alcohol use: Yes     Frequency: Monthly or less     Comment: Very rare   • Drug use: Yes     Types: Marijuana     Comment: last smoke yesterday abd uses CBD       Family History:   Family History   Problem Relation Age of Onset   • Diverticulitis Other    • Hypertension Other        Allergies: Benadryl [diphenhydramine hcl]    Current Medications:  •  hydrocortisone-pramoxine  •  dicyclomine  •  ferrous sulfate  •  lidocaine  "HCL  •  lisinopriL  •  loperamide  •  ondansetron ODT  •  pantoprazole    Review of Systems  Review of Systems   Constitutional: Positive for fatigue.   Gastrointestinal: Positive for abdominal distention, abdominal pain, anal bleeding and blood in stool.   All other systems reviewed and are negative.      Objective     Physicial Exam  Visit Vitals  /70   Pulse 83   Ht 1.905 m (6' 3\")   Wt 97.5 kg (215 lb)   BMI 26.87 kg/m²       Physical Exam   Constitutional: He is oriented to person, place, and time. He appears well-developed and well-nourished.   HENT:   Head: Normocephalic and atraumatic.   Eyes: Pupils are equal, round, and reactive to light. EOM are normal.   Neck: Normal range of motion. Neck supple.   Cardiovascular: Normal rate, regular rhythm, normal heart sounds and intact distal pulses.   No murmur heard.  Pulmonary/Chest: Effort normal and breath sounds normal. No respiratory distress. He has no wheezes.   Abdominal: Soft. He exhibits no distension and no mass. There is no tenderness. No hernia.   Genitourinary:   Genitourinary Comments: Multilobulated external hemorrhoid in the right posterior position.  No obvious fissure present.  Digital exam of the anus is normal with no palpable masses.  The prostate is smooth without nodules.  On anoscopy there is very prominent prolapse in the right posterior position that primarily retract spontaneously.  There is also second-degree prolapse in the left lateral internal hemorrhoid but smaller.  After informed consent, the right posterior internal hemorrhoid was banded above the dentate line.  He tolerated this well.   Musculoskeletal: Normal range of motion. He exhibits no edema or tenderness.   Lymphadenopathy:     He has no cervical adenopathy.   Neurological: He is alert and oriented to person, place, and time. No cranial nerve deficit.   Skin: Skin is warm and dry. Capillary refill takes less than 2 seconds. No rash noted.   Psychiatric: He has a " normal mood and affect. His behavior is normal.   Vitals reviewed.              Labs  Hemoglobin   Date Value Ref Range Status   07/22/2020 11.7 (L) 13.0 - 17.7 g/dL Final   04/11/2020 9.0 (L) 13.7 - 17.5 g/dL Final   04/10/2020 8.6 (L) 13.7 - 17.5 g/dL Final     Comment:     ALL RESULTS HAVE BEEN CHECKED   04/09/2020 10.7 (L) 13.7 - 17.5 g/dL Final   04/08/2020 12.1 (L) 13.7 - 17.5 g/dL Final   02/27/2020 11.4 (L) 13.7 - 17.5 g/dL Final   ]    Imaging  Not applicable    Assessment     Problem List Items Addressed This Visit        Circulatory    Grade II hemorrhoids - Primary    Overview     07/30/20 [facility]         Current Assessment & Plan     Significant prolapsing internal hemorrhoids with bleeding.  He did well with band ligation today.  I gave him post procedure instructions.  He was quite anemic from this but his hemoglobin has improved.  I am hoping that he will get significant improvement in his symptoms.  He will return to see me in 1 month and I will plan to band the left lateral hemorrhoid at that time.                     Charles Larkin MD

## 2020-07-30 NOTE — PROGRESS NOTES
Colon and Rectal Surgery Consult    Subjective     En Gonsales is a 34 y.o. male who is referred for consultation by Dr. Karsno Mendez for bleeding internal hemorrhoids.    He has had significant anal bleeding with bowel movements including blood dripping the toilet bowl.  This is been going on for about 1 year.  He has blood filling the toilet bowl and he is gotten anemic down to a dyllan hemoglobin of 8.6.  His hemoglobin has rebounded and most recently was 11.7.  He does have discomfort after bowel movements but improves when he manually reduces the prolapsing hemorrhoids.  He does not have sharp pains with bowel movements.  Of note, his brother is scheduled for hemorrhoid surgery soon.    Medical History:   Past Medical History:   Diagnosis Date   • GERD (gastroesophageal reflux disease)    • Hypertension    • Migraine        Surgical History:   Past Surgical History:   Procedure Laterality Date   • ADENOIDECTOMY     • APPENDECTOMY     • FEM-POP BYPASS     • HERNIA REPAIR     • SHOULDER SURGERY     • TONSILLECTOMY         Social History:   Social History     Tobacco Use   • Smoking status: Former Smoker     Types: Cigarettes   • Smokeless tobacco: Never Used   • Tobacco comment: Quit 1 year ago    Substance Use Topics   • Alcohol use: Yes     Frequency: Monthly or less     Comment: Very rare   • Drug use: Yes     Types: Marijuana     Comment: last smoke yesterday abd uses CBD       Family History:   Family History   Problem Relation Age of Onset   • Diverticulitis Other    • Hypertension Other        Allergies: Benadryl [diphenhydramine hcl]    Current Medications:  •  hydrocortisone-pramoxine  •  dicyclomine  •  ferrous sulfate  •  lidocaine HCL  •  lisinopriL  •  loperamide  •  ondansetron ODT  •  pantoprazole    Review of Systems  Review of Systems   Constitutional: Positive for fatigue.   Gastrointestinal: Positive for abdominal distention, abdominal pain, anal bleeding and blood in stool.   All other  "systems reviewed and are negative.      Objective     Physicial Exam  Visit Vitals  /70   Pulse 83   Ht 1.905 m (6' 3\")   Wt 97.5 kg (215 lb)   BMI 26.87 kg/m²       Physical Exam   Constitutional: He is oriented to person, place, and time. He appears well-developed and well-nourished.   HENT:   Head: Normocephalic and atraumatic.   Eyes: Pupils are equal, round, and reactive to light. EOM are normal.   Neck: Normal range of motion. Neck supple.   Cardiovascular: Normal rate, regular rhythm, normal heart sounds and intact distal pulses.   No murmur heard.  Pulmonary/Chest: Effort normal and breath sounds normal. No respiratory distress. He has no wheezes.   Abdominal: Soft. He exhibits no distension and no mass. There is no tenderness. No hernia.   Genitourinary:   Genitourinary Comments: Multilobulated external hemorrhoid in the right posterior position.  No obvious fissure present.  Digital exam of the anus is normal with no palpable masses.  The prostate is smooth without nodules.  On anoscopy there is very prominent prolapse in the right posterior position that primarily retract spontaneously.  There is also second-degree prolapse in the left lateral internal hemorrhoid but smaller.  After informed consent, the right posterior internal hemorrhoid was banded above the dentate line.  He tolerated this well.   Musculoskeletal: Normal range of motion. He exhibits no edema or tenderness.   Lymphadenopathy:     He has no cervical adenopathy.   Neurological: He is alert and oriented to person, place, and time. No cranial nerve deficit.   Skin: Skin is warm and dry. Capillary refill takes less than 2 seconds. No rash noted.   Psychiatric: He has a normal mood and affect. His behavior is normal.   Vitals reviewed.              Labs  Hemoglobin   Date Value Ref Range Status   07/22/2020 11.7 (L) 13.0 - 17.7 g/dL Final   04/11/2020 9.0 (L) 13.7 - 17.5 g/dL Final   04/10/2020 8.6 (L) 13.7 - 17.5 g/dL Final     " Comment:     ALL RESULTS HAVE BEEN CHECKED   04/09/2020 10.7 (L) 13.7 - 17.5 g/dL Final   04/08/2020 12.1 (L) 13.7 - 17.5 g/dL Final   02/27/2020 11.4 (L) 13.7 - 17.5 g/dL Final   ]    Imaging  Not applicable    Assessment     Problem List Items Addressed This Visit        Circulatory    Grade II hemorrhoids - Primary    Overview     07/30/20 [facility]         Current Assessment & Plan     Significant prolapsing internal hemorrhoids with bleeding.  He did well with band ligation today.  I gave him post procedure instructions.  He was quite anemic from this but his hemoglobin has improved.  I am hoping that he will get significant improvement in his symptoms.  He will return to see me in 1 month and I will plan to band the left lateral hemorrhoid at that time.                     Charles Larkin MD

## 2020-07-30 NOTE — PATIENT INSTRUCTIONS
You have received rubber band ligation of an internal hemorrhoid.    You may have some feeling of pressure, an achy sensation, and a feeling like you have to move your bowels.  That generally will be better by tomorrow.  About 10% of patients will have achiness for several days. Take Tylenol when you get home. It is also OK to take some ibuprofen. A warm bath can also be soothing.    You should expect to see some bleeding 5-7 days from now.  This is typically blood in the stool, or blood with wiping.  If there is significant hemorrhage that does not stop, please call the office.    It is rarely possible to get infection from banding.  Signs of this would be fever, significant anal pain, and difficulty urinating.  If you have any of these symptoms, please call the office.    If additional hemorrhoids need treatment, we want to wait about 1 month before additional procedures are performed.

## 2020-07-30 NOTE — ASSESSMENT & PLAN NOTE
Significant prolapsing internal hemorrhoids with bleeding.  He did well with band ligation today.  I gave him post procedure instructions.  He was quite anemic from this but his hemoglobin has improved.  I am hoping that he will get significant improvement in his symptoms.  He will return to see me in 1 month and I will plan to band the left lateral hemorrhoid at that time.

## 2020-09-10 ENCOUNTER — OFFICE VISIT (OUTPATIENT)
Dept: SURGERY | Facility: CLINIC | Age: 35
End: 2020-09-10
Payer: COMMERCIAL

## 2020-09-10 VITALS
HEIGHT: 75 IN | BODY MASS INDEX: 26.73 KG/M2 | WEIGHT: 215 LBS | SYSTOLIC BLOOD PRESSURE: 134 MMHG | DIASTOLIC BLOOD PRESSURE: 84 MMHG

## 2020-09-10 DIAGNOSIS — K64.2 GRADE III HEMORRHOIDS: Primary | ICD-10-CM

## 2020-09-10 PROCEDURE — 99213 OFFICE O/P EST LOW 20 MIN: CPT | Mod: 25 | Performed by: COLON & RECTAL SURGERY

## 2020-09-10 PROCEDURE — 46221 LIGATION OF HEMORRHOID(S): CPT | Performed by: COLON & RECTAL SURGERY

## 2020-09-10 NOTE — LETTER
"  Dear Marco A,    I saw En Gonsales in the office today in follow-up. Please see my note below.    If you have any additional questions, please do not hesitate to call me.    Sincerely,    Checo Larkin MD      _____________________________________      Colorectal Surgery Follow-up    Subjective     En Gonsales is a 35 y.o. male who returns in follow-up after band ligation of his right posterior internal hemorrhoid at the end of July.  He was having significant bleeding with anemia.    He is having much less symptoms.  He is not having the protrusion that he felt before and has had much less bleeding although he still is having some blood with wiping.    Medical History:   Past Medical History:   Diagnosis Date   • GERD (gastroesophageal reflux disease)    • Hypertension    • Migraine        Surgical History:   Past Surgical History:   Procedure Laterality Date   • ADENOIDECTOMY     • APPENDECTOMY     • FEM-POP BYPASS     • HERNIA REPAIR     • SHOULDER SURGERY     • TONSILLECTOMY         Allergies: Benadryl [diphenhydramine hcl]    Current Medications:  •  dicyclomine  •  ferrous sulfate  •  hydrocortisone-pramoxine  •  lidocaine HCL  •  lisinopriL  •  loperamide  •  ondansetron ODT  •  pantoprazole    Objective     Physicial Exam  Visit Vitals  /84   Ht 1.905 m (6' 3\")   Wt 97.5 kg (215 lb)   BMI 26.87 kg/m²       Physical Exam   Genitourinary:   Genitourinary Comments: There is spontaneous prolapse of the right anterior internal hemorrhoid.  I was able to reduce this with a digital examination.  On anoscopy, there still is some residual hemorrhoid in the right posterior position although much smaller than before.  There is prolapse of both the right anterior and left lateral internal hemorrhoids.  After informed consent, the right anterior internal hemorrhoid was banded above the dentate line.  He tolerated this well.           Assessment     Problem List Items Addressed This Visit        " Circulatory    Grade III hemorrhoids - Primary    Overview     07/30/20 [facility]  09/10/20 [facility]         Current Assessment & Plan     Successful band ligation of the right anterior internal hemorrhoid.  I will have him come back in 1 month.  We discussed post procedure instructions.  I will likely band the left lateral internal hemorrhoid at the next session.  He may still need further treatment in the right posterior area.                     Charles Larkin MD

## 2020-09-10 NOTE — PROGRESS NOTES
"Colorectal Surgery Follow-up    Subjective     En Gonsales is a 35 y.o. male who returns in follow-up after band ligation of his right posterior internal hemorrhoid at the end of July.  He was having significant bleeding with anemia.    He is having much less symptoms.  He is not having the protrusion that he felt before and has had much less bleeding although he still is having some blood with wiping.    Medical History:   Past Medical History:   Diagnosis Date   • GERD (gastroesophageal reflux disease)    • Hypertension    • Migraine        Surgical History:   Past Surgical History:   Procedure Laterality Date   • ADENOIDECTOMY     • APPENDECTOMY     • FEM-POP BYPASS     • HERNIA REPAIR     • SHOULDER SURGERY     • TONSILLECTOMY         Allergies: Benadryl [diphenhydramine hcl]    Current Medications:  •  dicyclomine  •  ferrous sulfate  •  hydrocortisone-pramoxine  •  lidocaine HCL  •  lisinopriL  •  loperamide  •  ondansetron ODT  •  pantoprazole    Objective     Physicial Exam  Visit Vitals  /84   Ht 1.905 m (6' 3\")   Wt 97.5 kg (215 lb)   BMI 26.87 kg/m²       Physical Exam   Genitourinary:   Genitourinary Comments: There is spontaneous prolapse of the right anterior internal hemorrhoid.  I was able to reduce this with a digital examination.  On anoscopy, there still is some residual hemorrhoid in the right posterior position although much smaller than before.  There is prolapse of both the right anterior and left lateral internal hemorrhoids.  After informed consent, the right anterior internal hemorrhoid was banded above the dentate line.  He tolerated this well.           Assessment     Problem List Items Addressed This Visit        Circulatory    Grade III hemorrhoids - Primary    Overview     07/30/20 [facility]  09/10/20 [facility]         Current Assessment & Plan     Successful band ligation of the right anterior internal hemorrhoid.  I will have him come back in 1 month.  We discussed post " procedure instructions.  I will likely band the left lateral internal hemorrhoid at the next session.  He may still need further treatment in the right posterior area.                     Charles Larkin MD

## 2020-09-10 NOTE — ASSESSMENT & PLAN NOTE
Successful band ligation of the right anterior internal hemorrhoid.  I will have him come back in 1 month.  We discussed post procedure instructions.  I will likely band the left lateral internal hemorrhoid at the next session.  He may still need further treatment in the right posterior area.

## 2020-10-06 ENCOUNTER — TELEPHONE (OUTPATIENT)
Dept: INTERNAL MEDICINE | Facility: HOSPITAL | Age: 35
End: 2020-10-06

## 2020-10-06 NOTE — TELEPHONE ENCOUNTER
Pt's cale arias  states he is having really bad stomach pains, diarrhea, also having rectal bleeding and is requesting a call back to discuss options, pt's refusing ED

## 2020-10-07 ENCOUNTER — TELEPHONE (OUTPATIENT)
Dept: INTERNAL MEDICINE | Facility: HOSPITAL | Age: 35
End: 2020-10-07

## 2020-10-07 NOTE — TELEPHONE ENCOUNTER
Pt is having rectal bleeding, pt is experiencing stomach pain vomiting, heartburn, and his skin is pale pt advised to go to ED for further evaluation nurse Rosa limon

## 2020-10-12 NOTE — TELEPHONE ENCOUNTER
I attempted to call patient back x 2 without success. Unable to leave message. Agree with recommendation for ED if continues to feel this way. May be a GI virus given acuity with exacerbation of his hemorrhoidal bleeding though recently banded with surgeon. Please keep me posted if he calls back.

## 2020-10-15 ENCOUNTER — OFFICE VISIT (OUTPATIENT)
Dept: SURGERY | Facility: CLINIC | Age: 35
End: 2020-10-15
Payer: COMMERCIAL

## 2020-10-15 VITALS
BODY MASS INDEX: 26.73 KG/M2 | HEART RATE: 91 BPM | WEIGHT: 215 LBS | HEIGHT: 75 IN | DIASTOLIC BLOOD PRESSURE: 67 MMHG | SYSTOLIC BLOOD PRESSURE: 123 MMHG

## 2020-10-15 DIAGNOSIS — K64.2 GRADE III HEMORRHOIDS: Primary | ICD-10-CM

## 2020-10-15 PROCEDURE — 99213 OFFICE O/P EST LOW 20 MIN: CPT | Mod: 25 | Performed by: COLON & RECTAL SURGERY

## 2020-10-15 PROCEDURE — 46221 LIGATION OF HEMORRHOID(S): CPT | Performed by: COLON & RECTAL SURGERY

## 2020-10-15 NOTE — LETTER
"  Dear Stacy,    I saw En Gonsales in the office today in follow-up. Please see my note below.    If you have any additional questions, please do not hesitate to call me.    Sincerely,        Charles Larkin MD      _____________________________________      Colorectal Surgery Follow-up    Subjective     En Gonsales is a 35 y.o. male who turns in follow-up for his hemorrhoidal prolapse.  He has had band ligation of the right posterior hemorrhoid in July, then the right anterior one in September.  I found in the right posterior bundle, which was initially very large still had residual prolapse when I examined him last.    He is having increased anal bleeding with bowel movements including dripping in the toilet bowl.  It is not quite as severe as when I saw him initially but still bleeding.  He still has iron deficiency anemia.    He continues to have bowel issues with frequency and episodes of abdominal pain.  He had a lot of nausea yesterday.  He has been evaluated for Crohn's disease and had some concerning findings on previous CT of the abdomen and MR enterography but his colonoscopy in the spring was apparently unremarkable in the terminal ileum.    Medical History:   Past Medical History:   Diagnosis Date   • GERD (gastroesophageal reflux disease)    • Hypertension    • Migraine        Surgical History:   Past Surgical History:   Procedure Laterality Date   • ADENOIDECTOMY     • APPENDECTOMY     • FEM-POP BYPASS     • HERNIA REPAIR     • SHOULDER SURGERY     • TONSILLECTOMY         Allergies: Benadryl [diphenhydramine hcl]    Current Medications:  •  dicyclomine  •  ferrous sulfate  •  hydrocortisone-pramoxine  •  lidocaine HCL  •  lisinopriL  •  loperamide  •  ondansetron ODT  •  pantoprazole    Objective     Physicial Exam  Visit Vitals  /67   Pulse 91   Ht 1.905 m (6' 3\")   Wt 97.5 kg (215 lb)   BMI 26.87 kg/m²       Physical Exam  Cardiovascular:      Rate and Rhythm: Normal rate and regular " rhythm.   Pulmonary:      Breath sounds: Normal breath sounds.   Abdominal:      General: There is no distension.      Palpations: Abdomen is soft. There is no mass.      Tenderness: There is no abdominal tenderness.   Genitourinary:     Comments: No obvious external prolapse.  Digital exam of the anus is normal with some small external tags.  On anoscopy, however, there is still significant prolapse in the right posterior internal hemorrhoid with some bleeding.  He also has enlargement of the left lateral internal hemorrhoid.  After informed consent, the right posterior bundle was banded again.  He tolerated this well.            Assessment     Problem List Items Addressed This Visit        Circulatory    Grade III hemorrhoids - Primary    Overview     [facility]20 [facility]         Current Assessment & Plan     I retreated the right posterior hemorrhoid.  This hemorrhoidal bundle was quite large initially and I did not feel that I was able to include the whole hemorrhoid in the band.  Hopefully, he will notice a difference with his bleeding now.  I gave him post procedure instructions.  I will see him back in 1 month.  We still likely need to treat the left lateral internal hemorrhoid.                     Charles Larkin MD

## 2020-10-15 NOTE — ASSESSMENT & PLAN NOTE
I retreated the right posterior hemorrhoid.  This hemorrhoidal bundle was quite large initially and I did not feel that I was able to include the whole hemorrhoid in the band.  Hopefully, he will notice a difference with his bleeding now.  I gave him post procedure instructions.  I will see him back in 1 month.  We still likely need to treat the left lateral internal hemorrhoid.

## 2020-10-15 NOTE — PROGRESS NOTES
"Colorectal Surgery Follow-up    Subjective     En Gonsales is a 35 y.o. male who turns in follow-up for his hemorrhoidal prolapse.  He has had band ligation of the right posterior hemorrhoid in July, then the right anterior one in September.  I found in the right posterior bundle, which was initially very large still had residual prolapse when I examined him last.    He is having increased anal bleeding with bowel movements including dripping in the toilet bowl.  It is not quite as severe as when I saw him initially but still bleeding.  He still has iron deficiency anemia.    He continues to have bowel issues with frequency and episodes of abdominal pain.  He had a lot of nausea yesterday.  He has been evaluated for Crohn's disease and had some concerning findings on previous CT of the abdomen and MR enterography but his colonoscopy in the spring was apparently unremarkable in the terminal ileum.    Medical History:   Past Medical History:   Diagnosis Date   • GERD (gastroesophageal reflux disease)    • Hypertension    • Migraine        Surgical History:   Past Surgical History:   Procedure Laterality Date   • ADENOIDECTOMY     • APPENDECTOMY     • FEM-POP BYPASS     • HERNIA REPAIR     • SHOULDER SURGERY     • TONSILLECTOMY         Allergies: Benadryl [diphenhydramine hcl]    Current Medications:  •  dicyclomine  •  ferrous sulfate  •  hydrocortisone-pramoxine  •  lidocaine HCL  •  lisinopriL  •  loperamide  •  ondansetron ODT  •  pantoprazole    Objective     Physicial Exam  Visit Vitals  /67   Pulse 91   Ht 1.905 m (6' 3\")   Wt 97.5 kg (215 lb)   BMI 26.87 kg/m²       Physical Exam  Cardiovascular:      Rate and Rhythm: Normal rate and regular rhythm.   Pulmonary:      Breath sounds: Normal breath sounds.   Abdominal:      General: There is no distension.      Palpations: Abdomen is soft. There is no mass.      Tenderness: There is no abdominal tenderness.   Genitourinary:     Comments: No obvious " external prolapse.  Digital exam of the anus is normal with some small external tags.  On anoscopy, however, there is still significant prolapse in the right posterior internal hemorrhoid with some bleeding.  He also has enlargement of the left lateral internal hemorrhoid.  After informed consent, the right posterior bundle was banded again.  He tolerated this well.            Assessment     Problem List Items Addressed This Visit        Circulatory    Grade III hemorrhoids - Primary    Overview     07/30/20 [facility]  09/10/20 [facility]  10/15/20 [facility]         Current Assessment & Plan     I retreated the right posterior hemorrhoid.  This hemorrhoidal bundle was quite large initially and I did not feel that I was able to include the whole hemorrhoid in the band.  Hopefully, he will notice a difference with his bleeding now.  I gave him post procedure instructions.  I will see him back in 1 month.  We still likely need to treat the left lateral internal hemorrhoid.                     Charles Larkin MD

## 2020-11-05 ENCOUNTER — OFFICE VISIT (OUTPATIENT)
Dept: GASTROENTEROLOGY | Facility: HOSPITAL | Age: 35
End: 2020-11-05
Attending: STUDENT IN AN ORGANIZED HEALTH CARE EDUCATION/TRAINING PROGRAM
Payer: COMMERCIAL

## 2020-11-05 VITALS
OXYGEN SATURATION: 97 % | TEMPERATURE: 98 F | BODY MASS INDEX: 26.49 KG/M2 | SYSTOLIC BLOOD PRESSURE: 133 MMHG | RESPIRATION RATE: 17 BRPM | DIASTOLIC BLOOD PRESSURE: 88 MMHG | HEIGHT: 75 IN | WEIGHT: 213 LBS | HEART RATE: 74 BPM

## 2020-11-05 DIAGNOSIS — K21.00 GASTROESOPHAGEAL REFLUX DISEASE WITH ESOPHAGITIS WITHOUT HEMORRHAGE: ICD-10-CM

## 2020-11-05 DIAGNOSIS — R11.2 NON-INTRACTABLE VOMITING WITH NAUSEA, UNSPECIFIED VOMITING TYPE: ICD-10-CM

## 2020-11-05 DIAGNOSIS — D50.0 IRON DEFICIENCY ANEMIA DUE TO CHRONIC BLOOD LOSS: Primary | ICD-10-CM

## 2020-11-05 RX ORDER — PANTOPRAZOLE SODIUM 40 MG/1
40 TABLET, DELAYED RELEASE ORAL DAILY
Qty: 90 TABLET | Refills: 3 | Status: SHIPPED | OUTPATIENT
Start: 2020-11-05 | End: 2023-09-26

## 2020-11-05 ASSESSMENT — ENCOUNTER SYMPTOMS
APPETITE CHANGE: 0
FEVER: 0
VOMITING: 1
UNEXPECTED WEIGHT CHANGE: 0
ACTIVITY CHANGE: 0
BLOOD IN STOOL: 1
NAUSEA: 1
FATIGUE: 0
DIARRHEA: 1
CHILLS: 0
ABDOMINAL PAIN: 0
CONSTIPATION: 0

## 2020-11-05 ASSESSMENT — PAIN SCALES - GENERAL: PAINLEVEL: 0-NO PAIN

## 2020-11-05 NOTE — PATIENT INSTRUCTIONS
"1) Rectal bleeding, anemia: I am reassured that you have had improvement in your blood counts and rectal bleeding after your interventions with Dr. Larkin. Please follow-up with him as scheduled.    2) Nausea, vomiting: we discussed the presumption that you may have altered motility since your virus sending you into random \"flares\" of symptoms worsened by THC which is known to slow the GI tract. We talked about stopping THC altogether.    3) Reflux: please start pantoprazole 40mg daily 30 minutes before meals. Please eliminate caffeine, alcohol, peppermint, chocolate from your diet as these will loosen your lower esophageal sphincter and worsen symptoms. Please avoid laying down for 1 hour after meals. You can consider increasing this medication to twice a day in 1 month if you are getting partial relief.     Please scheduled to see me in the New Year.  "

## 2020-11-05 NOTE — ASSESSMENT & PLAN NOTE
Bouts of N/V lasting for 24hours without other systemic signs/symptoms to suggest infectious process. Has been using THC x 8 years. 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     - counseled at length about avoidance of THC  - if this flares continue despite dc'ing THC can consider gastric emptying scan versus EGD to assess for delayed gastric emptying other structural pathology  - dietary modifications discussed include 6 small low fiber meals and BRAT diet after flares

## 2020-11-05 NOTE — PROGRESS NOTES
"   Gastroenterology Clinic Note       SUBJECTIVE   En Gonsales is a 35 y.o. year-old male with a past medical history of bipolar d/o, HTN and hemorrhoids who presents for f/u.    At last visit 7/16 he was continuing to have profuse rectal bleeding though improved anemia with repeat hgb 11.7. He has since seen Dr. Larkin ([facility]) x 3 who has intervened on his right sided internal hemorrhoids most recently 10/15. Since his last procedure he has marked improvement in his bowel habits and rectal bleeding with only scant blood on his stool at times. He has planned f/u to intervene on the left side.    Otherwise he reports that since our last visit he has had 3 episodes of 24 hours of nausea/vomiting/diarrhea. He denies any real abdominal pain, just discomfort during these times, no fevers/chills. He does note that his symptoms improve with hot showers. He reports using THC daily. He also reports improvement in these \"flares\" with elimination of lactose and reduction of high fiber foods in his diet. On a normal day he can have a mix of formed/loose stool, he usually has a BM 3x daily triggered by meals. He also reports daily \"indigestion\" worse after these flares and with alcohol. He denies epigastric pain of vomiting blood or black stools. Outside of these flares he is eating well, with good energy and stable weight.       REVIEW OF SYSTEMS   Review of Systems   Constitutional: Negative for activity change, appetite change, chills, fatigue, fever and unexpected weight change.   Gastrointestinal: Positive for blood in stool, diarrhea, nausea and vomiting. Negative for abdominal pain and constipation.        MEDICATIONS        Current Outpatient Medications:   •  dicyclomine (BENTYL) 20 mg tablet, Take 1 tablet (20 mg total) by mouth 2 (two) times a day. (Patient not taking: Reported on 7/16/2020 ), Disp: 20 tablet, Rfl: 0  •  ferrous sulfate 325 mg (65 mg iron) tablet, Take 1 tablet (325 mg total) by mouth 2 (two) times a " "day with meals., Disp: 60 tablet, Rfl: 0  •  hydrocortisone-pramoxine (ANALPRAM-HC) 2.5-1 % rectal cream, Insert into the rectum 4 (four) times a day as needed for hemorrhoids for up to 10 days., Disp: 30 g, Rfl: 0  •  lidocaine HCL (lidocaine) 2 % solution mucosal solution, Apply 5-10 mL topically 2 (two) times a day as needed (rectal pain). (Patient not taking: Reported on 7/30/2020 ), Disp: 100 mL, Rfl: 3  •  lisinopriL (PRINIVIL) 10 mg tablet, Take 10 mg by mouth once daily., Disp: , Rfl:   •  loperamide (IMODIUM) 2 mg capsule, Take 1 capsule (2 mg total) by mouth 4 (four) times a day as needed for diarrhea for up to 10 days., Disp: 30 capsule, Rfl: 0  •  ondansetron ODT (ZOFRAN ODT) 4 mg disintegrating tablet, Take 1 tablet (4 mg total) by mouth every 8 (eight) hours as needed for nausea or vomiting for up to 7 days., Disp: 12 tablet, Rfl: 0  •  pantoprazole (PROTONIX) 40 mg EC tablet, Take 1 tablet (40 mg total) by mouth daily., Disp: 30 tablet, Rfl: 0  •  pantoprazole (PROTONIX) 40 mg EC tablet, Take 1 tablet (40 mg total) by mouth daily., Disp: 90 tablet, Rfl: 3    PHYSICAL EXAMINATION   Vital signs:   Visit Vitals  /88   Pulse 74   Temp 36.7 °C (98 °F) (Oral)   Resp 17   Ht 1.905 m (6' 3\")   Wt 96.6 kg (213 lb)   SpO2 97%   BMI 26.62 kg/m²     Physical Exam  Constitutional:       Appearance: He is normal weight. He is not ill-appearing or diaphoretic.   Abdominal:      General: Abdomen is flat. Bowel sounds are normal. There is no distension.      Tenderness: There is no abdominal tenderness.          LABS / IMAGING/STUDIES      Labs Reviewed:   Lab Results   Component Value Date    GLUCOSE 90 04/11/2020    CALCIUM 8.9 04/11/2020     04/11/2020    K 3.6 04/11/2020    CO2 23 04/11/2020     04/11/2020    BUN 8 04/11/2020    CREATININE 0.9 04/11/2020     Lab Results   Component Value Date    WBC 6.1 07/22/2020    HGB 11.7 (L) 07/22/2020    HCT 36.2 (L) 07/22/2020    MCV 84 07/22/2020    PLT " 324 07/22/2020     No results found for: HEPCAB    Studies/Imaging Reviewed: as per HPI    GI Testing      Colonoscopy: 4/2020  EGD:  Hepatitis C antibody:   ASSESSMENT AND PLAN   Problem List Items Addressed This Visit        Digestive    Nausea & vomiting    Current Assessment & Plan     Bouts of N/V lasting for 24hours without other systemic signs/symptoms to suggest infectious process. Has been using THC x 8 years. 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     - counseled at length about avoidance of THC  - if this flares continue despite dc'ing THC can consider gastric emptying scan versus EGD to assess for delayed gastric emptying other structural pathology  - dietary modifications discussed include 6 small low fiber meals and BRAT diet after flares          Reflux esophagitis    Current Assessment & Plan     Daily symptoms worsened during vomiting, with caffeine  Suspect he has some degree of esophagitis secondary to these issues    - counseled on dietary modifications including eliminating caffeine, chocolate, alcohol, peppermint  - will start daily pantoprazole 40mg before breakfast (can uptitrate to BID in 1-2 months if only partial relief)  - can consider carafate   - counseled on avoidance of THC to eliminate N/V episodes and treat underlying cause         Relevant Medications    pantoprazole (PROTONIX) 40 mg EC tablet       Hematologic    Iron deficiency anemia - Primary    Current Assessment & Plan     Colonoscopy 4/2020 normal with normal biopsies including of TI with internal hemorrhoids  Celiac panel normal  Last hgb 11.7  Highly suspect this is 2/2 hemorrhoidal bleeding and is improving    - c/w iron supplementation  - f/u with Dr. Larkin  - with eradication of hemorrhoids will repeat CBC, iron studies in new year to ensure resolution  - if continued ALEXANDER into next  summer/fall with above will consider EGD for completeness                Stacy Villatoro MD  11/05/20  2:32 PM

## 2020-11-05 NOTE — ASSESSMENT & PLAN NOTE
Daily symptoms worsened during vomiting, with caffeine  Suspect he has some degree of esophagitis secondary to these issues    - counseled on dietary modifications including eliminating caffeine, chocolate, alcohol, peppermint  - will start daily pantoprazole 40mg before breakfast (can uptitrate to BID in 1-2 months if only partial relief)  - can consider carafate   - counseled on avoidance of THC to eliminate N/V episodes and treat underlying cause   18-Aug-2019 15:37

## 2020-11-05 NOTE — ASSESSMENT & PLAN NOTE
Colonoscopy 4/2020 normal with normal biopsies including of TI with internal hemorrhoids  Celiac panel normal  Last hgb 11.7  Highly suspect this is 2/2 hemorrhoidal bleeding and is improving    - c/w iron supplementation  - f/u with Dr. Larkin  - with eradication of hemorrhoids will repeat CBC, iron studies in new year to ensure resolution  - if continued ALEXANDER into next summer/fall with above will consider EGD for completeness

## 2020-12-28 ENCOUNTER — OFFICE VISIT (OUTPATIENT)
Dept: SURGERY | Facility: CLINIC | Age: 35
End: 2020-12-28
Payer: COMMERCIAL

## 2020-12-28 VITALS
BODY MASS INDEX: 26.49 KG/M2 | DIASTOLIC BLOOD PRESSURE: 87 MMHG | SYSTOLIC BLOOD PRESSURE: 118 MMHG | WEIGHT: 213 LBS | HEART RATE: 110 BPM | HEIGHT: 75 IN

## 2020-12-28 DIAGNOSIS — K64.2 GRADE III HEMORRHOIDS: Primary | ICD-10-CM

## 2020-12-28 PROCEDURE — 46221 LIGATION OF HEMORRHOID(S): CPT | Performed by: COLON & RECTAL SURGERY

## 2020-12-28 PROCEDURE — 99213 OFFICE O/P EST LOW 20 MIN: CPT | Mod: 25 | Performed by: COLON & RECTAL SURGERY

## 2020-12-28 NOTE — PROGRESS NOTES
"Colorectal Surgery Follow-up    Subjective     En Gonsales is a 35 y.o. male who returns for his chronic hemorrhoidal prolapse.  In October, I rebanded the right posterior hemorrhoid which was still quite enlarged.  He has had significant improvement in his overall symptoms but is still having anal bleeding and feels that he is still having some protrusion on the left side which I have not treated.  Overall he feels well.    Medical History:   Past Medical History:   Diagnosis Date   • GERD (gastroesophageal reflux disease)    • Hypertension    • Migraine        Surgical History:   Past Surgical History:   Procedure Laterality Date   • ADENOIDECTOMY     • APPENDECTOMY     • FEM-POP BYPASS     • HERNIA REPAIR     • SHOULDER SURGERY     • TONSILLECTOMY         Allergies: Benadryl [diphenhydramine hcl]    Current Medications:  •  dicyclomine  •  ferrous sulfate  •  hydrocortisone-pramoxine  •  lidocaine HCL  •  lisinopriL  •  loperamide  •  ondansetron ODT  •  pantoprazole  •  pantoprazole    Objective     Physicial Exam  Visit Vitals  /87   Pulse (!) 110   Ht 1.905 m (6' 3\")   Wt 96.6 kg (213 lb)   BMI 26.62 kg/m²       Physical Exam  Cardiovascular:      Rate and Rhythm: Normal rate and regular rhythm.   Pulmonary:      Breath sounds: Normal breath sounds.   Abdominal:      General: There is no distension.      Palpations: Abdomen is soft. There is no mass.      Tenderness: There is no abdominal tenderness.   Genitourinary:     Comments: Small external hemorrhoidal tags.  Digital exam the anus is normal without any masses.  On anoscopy, the right posterior internal hemorrhoid has completely resolved.  There is still some enlargement of the right anterior bundle but the primary prolapse is in the left lateral area.  After informed consent, the left lateral internal hemorrhoid was banded above the dentate line.  He tolerated this well.  The band is at the top of the anorectal ring.            Assessment "     Problem List Items Addressed This Visit        Circulatory    Grade III hemorrhoids - Primary    Overview     07/30/20 [facility] RP  09/10/20 [facility]  [facility]  [facility]         Current Assessment & Plan     I have now treated the left lateral internal hemorrhoid, the last of his large hemorrhoids.  There is still some residual enlargement of the right anterior bundle but this may not be symptomatic.  I gave him post procedure instructions and I will reevaluate in 1 month.                     Charles Larkin MD

## 2020-12-28 NOTE — ASSESSMENT & PLAN NOTE
I have now treated the left lateral internal hemorrhoid, the last of his large hemorrhoids.  There is still some residual enlargement of the right anterior bundle but this may not be symptomatic.  I gave him post procedure instructions and I will reevaluate in 1 month.

## 2020-12-28 NOTE — LETTER
"  Dear Gaston,    I saw En Gonsales in the office today in follow-up. Please see my note below.    If you have any additional questions, please do not hesitate to call me.    Sincerely,    Checo Larkin MD      _____________________________________      Colorectal Surgery Follow-up    Subjective     En Gonsales is a 35 y.o. male who returns for his chronic hemorrhoidal prolapse.  In October, I rebanded the right posterior hemorrhoid which was still quite enlarged.  He has had significant improvement in his overall symptoms but is still having anal bleeding and feels that he is still having some protrusion on the left side which I have not treated.  Overall he feels well.    Medical History:   Past Medical History:   Diagnosis Date   • GERD (gastroesophageal reflux disease)    • Hypertension    • Migraine        Surgical History:   Past Surgical History:   Procedure Laterality Date   • ADENOIDECTOMY     • APPENDECTOMY     • FEM-POP BYPASS     • HERNIA REPAIR     • SHOULDER SURGERY     • TONSILLECTOMY         Allergies: Benadryl [diphenhydramine hcl]    Current Medications:  •  dicyclomine  •  ferrous sulfate  •  hydrocortisone-pramoxine  •  lidocaine HCL  •  lisinopriL  •  loperamide  •  ondansetron ODT  •  pantoprazole  •  pantoprazole    Objective     Physicial Exam  Visit Vitals  /87   Pulse (!) 110   Ht 1.905 m (6' 3\")   Wt 96.6 kg (213 lb)   BMI 26.62 kg/m²       Physical Exam  Cardiovascular:      Rate and Rhythm: Normal rate and regular rhythm.   Pulmonary:      Breath sounds: Normal breath sounds.   Abdominal:      General: There is no distension.      Palpations: Abdomen is soft. There is no mass.      Tenderness: There is no abdominal tenderness.   Genitourinary:     Comments: Small external hemorrhoidal tags.  Digital exam the anus is normal without any masses.  On anoscopy, the right posterior internal hemorrhoid has completely resolved.  There is still some enlargement of the right " anterior bundle but the primary prolapse is in the left lateral area.  After informed consent, the left lateral internal hemorrhoid was banded above the dentate line.  He tolerated this well.  The band is at the top of the anorectal ring.            Assessment     Problem List Items Addressed This Visit        Circulatory    Grade III hemorrhoids - Primary    Overview     07/30/20 [facility] RPH  09/10/20 [facility] RA  10/15/20 [facility]  [facility]         Current Assessment & Plan     I have now treated the left lateral internal hemorrhoid, the last of his large hemorrhoids.  There is still some residual enlargement of the right anterior bundle but this may not be symptomatic.  I gave him post procedure instructions and I will reevaluate in 1 month.                     Charles Larkin MD

## 2022-02-10 ENCOUNTER — OFFICE VISIT (OUTPATIENT)
Dept: GASTROENTEROLOGY | Facility: HOSPITAL | Age: 37
End: 2022-02-10
Attending: STUDENT IN AN ORGANIZED HEALTH CARE EDUCATION/TRAINING PROGRAM
Payer: COMMERCIAL

## 2022-02-10 VITALS
HEART RATE: 92 BPM | SYSTOLIC BLOOD PRESSURE: 137 MMHG | DIASTOLIC BLOOD PRESSURE: 86 MMHG | BODY MASS INDEX: 27.52 KG/M2 | OXYGEN SATURATION: 96 % | WEIGHT: 221.3 LBS | TEMPERATURE: 97 F | HEIGHT: 75 IN | RESPIRATION RATE: 18 BRPM

## 2022-02-10 DIAGNOSIS — D50.9 IRON DEFICIENCY ANEMIA, UNSPECIFIED IRON DEFICIENCY ANEMIA TYPE: Primary | ICD-10-CM

## 2022-02-10 DIAGNOSIS — K62.5 RECTAL BLEEDING: ICD-10-CM

## 2022-02-10 ASSESSMENT — ENCOUNTER SYMPTOMS
ABDOMINAL PAIN: 0
APPETITE CHANGE: 0
CONSTIPATION: 0
VOMITING: 0
NAUSEA: 0
ABDOMINAL DISTENTION: 0
DIARRHEA: 0
ACTIVITY CHANGE: 0
BLOOD IN STOOL: 1
UNEXPECTED WEIGHT CHANGE: 0

## 2022-02-10 ASSESSMENT — PAIN SCALES - GENERAL: PAINLEVEL: 0-NO PAIN

## 2022-02-10 NOTE — ASSESSMENT & PLAN NOTE
Patient has h/o hemorrhoids not completely eradicated. He complains of intermittent small volume rectal bleeding with no other GI symptoms. He is concerned for food intolerance. I would suspect some other symptoms - such as bloating, nausea, diarrhea, cramping, weight loss if this were the case. More so, I suspect his hemorrhoids are intermittently bleeding causing rectal bleeding. I expressed this at today's visit.     - plan for f/u with Dr. Larkin  - if hemorrhoids are eradicated - repeat CBC, iron studies, hpylori  - if all of this is improved no further w/u needed and have asked patient to re-introduce the foods he is eliminating into his diet  - if remains anemic despite hemorrhoidal management to consider an EGD with biopsies to r/o small bowel malabsorption or upper GI sources of blood loss  - RTC in 2 months

## 2022-02-10 NOTE — PROGRESS NOTES
Gastroenterology Clinic Note       SUBJECTIVE   En Gonsales is a 36 y.o. year-old male with a past medical history of hemorrhoids-->ALEXANDER, bipolar d/o, HTN who sees me in follow up.    I last saw patient in office in 2020 for rectal bleeding/anemia/diarrhea. He had a colonoscopy 4/2020 to TI with pan biopsies that were negative. He was found to have internal hemorrhoids and has since been banded by Dr. Larkin though per last note 12/2020 has residual right hemorrhoid that is recommended for further therapy (has not had follow up since).     At our last visit that year he complained of intermittent episodes of acute onset N/V/D lasting 24 hours a/w daily THC use. I asked him to stop THC which he did with resolution.    However today he is worried about food intolerance. He states he noticed with dairy and vegetable oil and almonds he gets small volume rectal bleeding mixed in his stools. He denies nausea,vomiting, bloating or diarrhea. He went to an allergist who suggested he needed an EGD. Notably he has had normal celiac serologies.    He is otherwise eating well and maintaining his weight.       REVIEW OF SYSTEMS   Review of Systems   Constitutional: Negative for activity change, appetite change and unexpected weight change.   Gastrointestinal: Positive for blood in stool. Negative for abdominal distention, abdominal pain, constipation, diarrhea, nausea and vomiting.        MEDICATIONS        Current Outpatient Medications:   •  dicyclomine (BENTYL) 20 mg tablet, Take 1 tablet (20 mg total) by mouth 2 (two) times a day. (Patient not taking: Reported on 7/16/2020 ), Disp: 20 tablet, Rfl: 0  •  ferrous sulfate 325 mg (65 mg iron) tablet, Take 1 tablet (325 mg total) by mouth 2 (two) times a day with meals., Disp: 60 tablet, Rfl: 0  •  hydrocortisone-pramoxine (ANALPRAM-HC) 2.5-1 % rectal cream, Insert into the rectum 4 (four) times a day as needed for hemorrhoids for up to 10 days., Disp: 30 g, Rfl: 0  •   "lisinopriL (PRINIVIL) 10 mg tablet, Take 10 mg by mouth once daily., Disp: , Rfl:   •  loperamide (IMODIUM) 2 mg capsule, Take 1 capsule (2 mg total) by mouth 4 (four) times a day as needed for diarrhea for up to 10 days., Disp: 30 capsule, Rfl: 0  •  ondansetron ODT (ZOFRAN ODT) 4 mg disintegrating tablet, Take 1 tablet (4 mg total) by mouth every 8 (eight) hours as needed for nausea or vomiting for up to 7 days., Disp: 12 tablet, Rfl: 0  •  pantoprazole (PROTONIX) 40 mg EC tablet, Take 1 tablet (40 mg total) by mouth daily., Disp: 30 tablet, Rfl: 0  •  pantoprazole (PROTONIX) 40 mg EC tablet, Take 1 tablet (40 mg total) by mouth daily., Disp: 90 tablet, Rfl: 3    PHYSICAL EXAMINATION   Vital signs:   Visit Vitals  /86 (BP Location: Left upper arm, Patient Position: Sitting)   Pulse 92   Temp 36.1 °C (97 °F) (Temporal)   Resp 18   Ht 1.905 m (6' 3\")   Wt 100 kg (221 lb 4.8 oz)   SpO2 96%   BMI 27.66 kg/m²     Physical Exam  Constitutional:       Appearance: He is not ill-appearing or diaphoretic.   Pulmonary:      Effort: No respiratory distress.   Abdominal:      General: There is no distension.      Palpations: Abdomen is soft.      Tenderness: There is no abdominal tenderness.   Neurological:      General: No focal deficit present.      Mental Status: He is oriented to person, place, and time.          LABS / IMAGING/STUDIES      Labs Reviewed:   Lab Results   Component Value Date    GLUCOSE 90 04/11/2020    CALCIUM 8.9 04/11/2020     04/11/2020    K 3.6 04/11/2020    CO2 23 04/11/2020     04/11/2020    BUN 8 04/11/2020    CREATININE 0.9 04/11/2020     Lab Results   Component Value Date    WBC 6.1 07/22/2020    HGB 11.7 (L) 07/22/2020    HCT 36.2 (L) 07/22/2020    MCV 84 07/22/2020     07/22/2020     No results found for: HEPCAB    Studies/Imaging Reviewed: as per HPI         ASSESSMENT AND PLAN   Problem List Items Addressed This Visit        Digestive    Rectal bleeding    Current " Assessment & Plan     Patient has h/o hemorrhoids not completely eradicated. He complains of intermittent small volume rectal bleeding with no other GI symptoms. He is concerned for food intolerance. I would suspect some other symptoms - such as bloating, nausea, diarrhea, cramping, weight loss if this were the case. More so, I suspect his hemorrhoids are intermittently bleeding causing rectal bleeding. I expressed this at today's visit.     - plan for f/u with Dr. Larkin  - if hemorrhoids are eradicated - repeat CBC, iron studies, hpylori  - if all of this is improved no further w/u needed and have asked patient to re-introduce the foods he is eliminating into his diet  - if remains anemic despite hemorrhoidal management to consider an EGD with biopsies to r/o small bowel malabsorption or upper GI sources of blood loss  - RTC in 2 months            Hematologic    Iron deficiency anemia - Primary    Relevant Orders    CBC    Iron and TIBC    Ferritin    Helicobacter pylori Antigen, EIA, Stool           Stacy Villatoro MD  02/10/22  4:04 PM

## 2022-02-10 NOTE — PATIENT INSTRUCTIONS
En,  I want to repeat your blood counts and iron studies and also check your stool for an infection of your stomach. Please see Dr. Larkin for rectal bleeding to ensure your hemorrhoids are eradicated.  If your hemorrhoids are fully treated and you continue to show signs of anemia we should plan or an upper endoscopy.  Schedule a follow-up with me in 2 months to discuss this.

## 2022-02-24 ENCOUNTER — OFFICE VISIT (OUTPATIENT)
Dept: SURGERY | Facility: CLINIC | Age: 37
End: 2022-02-24
Payer: COMMERCIAL

## 2022-02-24 VITALS
HEART RATE: 85 BPM | BODY MASS INDEX: 27.35 KG/M2 | DIASTOLIC BLOOD PRESSURE: 68 MMHG | WEIGHT: 220 LBS | HEIGHT: 75 IN | SYSTOLIC BLOOD PRESSURE: 120 MMHG

## 2022-02-24 DIAGNOSIS — K64.2 GRADE III HEMORRHOIDS: Primary | ICD-10-CM

## 2022-02-24 PROCEDURE — 99213 OFFICE O/P EST LOW 20 MIN: CPT | Mod: 25 | Performed by: COLON & RECTAL SURGERY

## 2022-02-24 PROCEDURE — 46221 LIGATION OF HEMORRHOID(S): CPT | Performed by: COLON & RECTAL SURGERY

## 2022-02-24 PROCEDURE — 3008F BODY MASS INDEX DOCD: CPT | Performed by: COLON & RECTAL SURGERY

## 2022-02-24 NOTE — ASSESSMENT & PLAN NOTE
Recurrent hemorrhoidal prolapse in the right anterior position.  This was the area that was still enlarged when I saw him last 2 years ago.  I think he will respond fairly well to band ligation.  I gave him post procedure instructions and I will reevaluate in 1 month.

## 2022-02-24 NOTE — LETTER
"  Dear Stacy,    I saw En Gonsales in the office today in follow-up. Please see my note below.    If you have any additional questions, please do not hesitate to call me.    Sincerely,    Checo Larkin MD      _____________________________________      Colorectal Surgery Follow-up    Subjective     En Gonsales is a 36 y.o. male who returns for reevaluation of his hemorrhoids.  He has had all 3 hemorrhoidal bundles banded, the most recent one in December 2020.  There was some slight residual enlargement of the right anterior bundle at that time.  He did not see me back in follow-up.    He was doing well until about 6 months ago when he started having change in bowel habits that was diet related.  He seems to be particularly sensitive to dairy products and this seemed to lead to rectal bleeding.  He was having primarily blood in the toilet.  No pain with bowel movements.  He has had episodes of abdominal pain and diarrhea but not have bleeding at that time.  He also seems to be sensitive to vegetable oils.    He saw Stacy michel in the GI clinic who is working with him on a number of his issues but was concerned about the rectal bleeding given his hemorrhoid history.    Medical History:   Past Medical History:   Diagnosis Date   • GERD (gastroesophageal reflux disease)    • Hypertension    • Migraine        Surgical History:   Past Surgical History:   Procedure Laterality Date   • ADENOIDECTOMY     • APPENDECTOMY     • FEM-POP BYPASS     • HERNIA REPAIR     • SHOULDER SURGERY     • TONSILLECTOMY         Allergies: Benadryl [diphenhydramine hcl]    Current Medications:  •  dicyclomine  •  ferrous sulfate  •  hydrocortisone-pramoxine  •  lisinopriL  •  loperamide  •  ondansetron ODT  •  pantoprazole  •  pantoprazole    Objective     Physicial Exam  Visit Vitals  /68   Pulse 85   Ht 1.905 m (6' 3\")   Wt 99.8 kg (220 lb)   BMI 27.50 kg/m²       Physical Exam  Vitals reviewed. " Pat Pending    Insurance response  Prescription Drug Insurance: [employer]  Notes: Prior authorization submitted - will update provider when decision has been made by insurance.            Constitutional:       Appearance: Normal appearance. He is well-developed.   HENT:      Head: Normocephalic and atraumatic.   Eyes:      Pupils: Pupils are equal, round, and reactive to light.   Cardiovascular:      Rate and Rhythm: Normal rate and regular rhythm.      Heart sounds: Normal heart sounds. No murmur heard.  Pulmonary:      Effort: Pulmonary effort is normal. No respiratory distress.      Breath sounds: Normal breath sounds. No wheezing.   Abdominal:      General: There is no distension.      Palpations: Abdomen is soft. There is no mass.      Tenderness: There is no abdominal tenderness.      Hernia: No hernia is present.   Genitourinary:     Comments: External hemorrhoidal tags circumferentially.  No prolapse with straining.  This was seen in the anus is normal without tenderness or mass.  On anoscopy, he has a prominent bilobed internal hemorrhoid prolapsing in the right anterior position.  There was active bleeding from the most posterior portion of this.  After informed consent, this hemorrhoidal bundle was banded above the dentate line in the right anterior position.  I was able to take care of most of this hemorrhoid but not all.  He tolerated this well.  Musculoskeletal:         General: No tenderness. Normal range of motion.      Cervical back: Normal range of motion and neck supple.   Lymphadenopathy:      Cervical: No cervical adenopathy.   Skin:     General: Skin is warm and dry.      Capillary Refill: Capillary refill takes less than 2 seconds.      Findings: No rash.   Neurological:      Mental Status: He is alert and oriented to person, place, and time.      Cranial Nerves: No cranial nerve deficit.   Psychiatric:         Mood and Affect: Mood normal.         Behavior: Behavior normal.             Assessment     Problem List Items Addressed This Visit        Circulatory    Grade III hemorrhoids - Primary    Overview     07/30/20 [facility]  09/10/20 [facility]  10/15/20 [facility]  12/28/20 RB  [facility]  02/24/22 [facility] ([facility])         Current Assessment & Plan     Recurrent hemorrhoidal prolapse in the right anterior position.  This was the area that was still enlarged when I saw him last 2 years ago.  I think he will respond fairly well to band ligation.  I gave him post procedure instructions and I will reevaluate in 1 month.                     Charles Larkin MD

## 2022-02-24 NOTE — PROGRESS NOTES
"Colorectal Surgery Follow-up    Subjective     En Gonsales is a 36 y.o. male who returns for reevaluation of his hemorrhoids.  He has had all 3 hemorrhoidal bundles banded, the most recent one in December 2020.  There was some slight residual enlargement of the right anterior bundle at that time.  He did not see me back in follow-up.    He was doing well until about 6 months ago when he started having change in bowel habits that was diet related.  He seems to be particularly sensitive to dairy products and this seemed to lead to rectal bleeding.  He was having primarily blood in the toilet.  No pain with bowel movements.  He has had episodes of abdominal pain and diarrhea but not have bleeding at that time.  He also seems to be sensitive to vegetable oils.    He saw Stacy michel in the GI clinic who is working with him on a number of his issues but was concerned about the rectal bleeding given his hemorrhoid history.    Medical History:   Past Medical History:   Diagnosis Date   • GERD (gastroesophageal reflux disease)    • Hypertension    • Migraine        Surgical History:   Past Surgical History:   Procedure Laterality Date   • ADENOIDECTOMY     • APPENDECTOMY     • FEM-POP BYPASS     • HERNIA REPAIR     • SHOULDER SURGERY     • TONSILLECTOMY         Allergies: Benadryl [diphenhydramine hcl]    Current Medications:  •  dicyclomine  •  ferrous sulfate  •  hydrocortisone-pramoxine  •  lisinopriL  •  loperamide  •  ondansetron ODT  •  pantoprazole  •  pantoprazole    Objective     Physicial Exam  Visit Vitals  /68   Pulse 85   Ht 1.905 m (6' 3\")   Wt 99.8 kg (220 lb)   BMI 27.50 kg/m²       Physical Exam  Vitals reviewed.   Constitutional:       Appearance: Normal appearance. He is well-developed.   HENT:      Head: Normocephalic and atraumatic.   Eyes:      Pupils: Pupils are equal, round, and reactive to light.   Cardiovascular:      Rate and Rhythm: Normal rate and regular rhythm.      Heart " sounds: Normal heart sounds. No murmur heard.  Pulmonary:      Effort: Pulmonary effort is normal. No respiratory distress.      Breath sounds: Normal breath sounds. No wheezing.   Abdominal:      General: There is no distension.      Palpations: Abdomen is soft. There is no mass.      Tenderness: There is no abdominal tenderness.      Hernia: No hernia is present.   Genitourinary:     Comments: External hemorrhoidal tags circumferentially.  No prolapse with straining.  This was seen in the anus is normal without tenderness or mass.  On anoscopy, he has a prominent bilobed internal hemorrhoid prolapsing in the right anterior position.  There was active bleeding from the most posterior portion of this.  After informed consent, this hemorrhoidal bundle was banded above the dentate line in the right anterior position.  I was able to take care of most of this hemorrhoid but not all.  He tolerated this well.  Musculoskeletal:         General: No tenderness. Normal range of motion.      Cervical back: Normal range of motion and neck supple.   Lymphadenopathy:      Cervical: No cervical adenopathy.   Skin:     General: Skin is warm and dry.      Capillary Refill: Capillary refill takes less than 2 seconds.      Findings: No rash.   Neurological:      Mental Status: He is alert and oriented to person, place, and time.      Cranial Nerves: No cranial nerve deficit.   Psychiatric:         Mood and Affect: Mood normal.         Behavior: Behavior normal.             Assessment     Problem List Items Addressed This Visit        Circulatory    Grade III hemorrhoids - Primary    Overview     07/30/20 [facility]  09/10/20 [facility]  10/15/20 [facility]  12/28/20 [facility]  02/24/22 [facility] (Partial)         Current Assessment & Plan     Recurrent hemorrhoidal prolapse in the right anterior position.  This was the area that was still enlarged when I saw him last 2 years ago.  I think he will respond fairly well to band ligation.  I gave him  post procedure instructions and I will reevaluate in 1 month.                     Charles Larkin MD

## 2023-03-06 ENCOUNTER — APPOINTMENT (RX ONLY)
Dept: URBAN - METROPOLITAN AREA CLINIC 374 | Facility: CLINIC | Age: 38
Setting detail: DERMATOLOGY
End: 2023-03-06

## 2023-03-06 DIAGNOSIS — L81.4 OTHER MELANIN HYPERPIGMENTATION: ICD-10-CM

## 2023-03-06 DIAGNOSIS — Z71.89 OTHER SPECIFIED COUNSELING: ICD-10-CM

## 2023-03-06 DIAGNOSIS — D18.0 HEMANGIOMA: ICD-10-CM

## 2023-03-06 DIAGNOSIS — D485 NEOPLASM OF UNCERTAIN BEHAVIOR OF SKIN: ICD-10-CM

## 2023-03-06 DIAGNOSIS — D22 MELANOCYTIC NEVI: ICD-10-CM

## 2023-03-06 PROBLEM — D22.71 MELANOCYTIC NEVI OF RIGHT LOWER LIMB, INCLUDING HIP: Status: ACTIVE | Noted: 2023-03-06

## 2023-03-06 PROBLEM — D48.5 NEOPLASM OF UNCERTAIN BEHAVIOR OF SKIN: Status: ACTIVE | Noted: 2023-03-06

## 2023-03-06 PROBLEM — D18.01 HEMANGIOMA OF SKIN AND SUBCUTANEOUS TISSUE: Status: ACTIVE | Noted: 2023-03-06

## 2023-03-06 PROBLEM — D22.62 MELANOCYTIC NEVI OF LEFT UPPER LIMB, INCLUDING SHOULDER: Status: ACTIVE | Noted: 2023-03-06

## 2023-03-06 PROBLEM — D23.71 OTHER BENIGN NEOPLASM OF SKIN OF RIGHT LOWER LIMB, INCLUDING HIP: Status: ACTIVE | Noted: 2023-03-06

## 2023-03-06 PROBLEM — D22.72 MELANOCYTIC NEVI OF LEFT LOWER LIMB, INCLUDING HIP: Status: ACTIVE | Noted: 2023-03-06

## 2023-03-06 PROBLEM — D22.5 MELANOCYTIC NEVI OF TRUNK: Status: ACTIVE | Noted: 2023-03-06

## 2023-03-06 PROBLEM — D22.61 MELANOCYTIC NEVI OF RIGHT UPPER LIMB, INCLUDING SHOULDER: Status: ACTIVE | Noted: 2023-03-06

## 2023-03-06 PROCEDURE — ? COUNSELING

## 2023-03-06 PROCEDURE — ? DEFER

## 2023-03-06 PROCEDURE — ? PHOTO-DOCUMENTATION

## 2023-03-06 PROCEDURE — ? FULL BODY SKIN EXAM

## 2023-03-06 PROCEDURE — 99203 OFFICE O/P NEW LOW 30 MIN: CPT

## 2023-03-06 PROCEDURE — ? SUNSCREEN RECOMMENDATIONS

## 2023-03-06 ASSESSMENT — LOCATION DETAILED DESCRIPTION DERM
LOCATION DETAILED: INFERIOR THORACIC SPINE
LOCATION DETAILED: RIGHT DISTAL PRETIBIAL REGION
LOCATION DETAILED: RIGHT INFERIOR CENTRAL MALAR CHEEK
LOCATION DETAILED: LEFT PROXIMAL PRETIBIAL REGION
LOCATION DETAILED: LEFT DISTAL ULNAR DORSAL FOREARM
LOCATION DETAILED: LEFT MEDIAL SUPERIOR CHEST
LOCATION DETAILED: RIGHT DISTAL POSTERIOR UPPER ARM
LOCATION DETAILED: PERIUMBILICAL SKIN
LOCATION DETAILED: LEFT INFERIOR CENTRAL MALAR CHEEK
LOCATION DETAILED: LEFT DISTAL POSTERIOR UPPER ARM
LOCATION DETAILED: LEFT MEDIAL UPPER BACK
LOCATION DETAILED: RIGHT DISTAL DORSAL FOREARM
LOCATION DETAILED: RIGHT ANTERIOR DISTAL THIGH
LOCATION DETAILED: LEFT ANTERIOR DISTAL THIGH

## 2023-03-06 ASSESSMENT — LOCATION SIMPLE DESCRIPTION DERM
LOCATION SIMPLE: RIGHT PRETIBIAL REGION
LOCATION SIMPLE: LEFT UPPER BACK
LOCATION SIMPLE: RIGHT UPPER ARM
LOCATION SIMPLE: RIGHT THIGH
LOCATION SIMPLE: LEFT PRETIBIAL REGION
LOCATION SIMPLE: ABDOMEN
LOCATION SIMPLE: UPPER BACK
LOCATION SIMPLE: LEFT THIGH
LOCATION SIMPLE: LEFT POSTERIOR UPPER ARM
LOCATION SIMPLE: RIGHT CHEEK
LOCATION SIMPLE: LEFT FOREARM
LOCATION SIMPLE: CHEST
LOCATION SIMPLE: RIGHT FOREARM
LOCATION SIMPLE: LEFT UPPER ARM
LOCATION SIMPLE: LEFT CHEEK

## 2023-03-06 ASSESSMENT — LOCATION ZONE DERM
LOCATION ZONE: TRUNK
LOCATION ZONE: ARM
LOCATION ZONE: FACE
LOCATION ZONE: LEG

## 2023-03-06 NOTE — PROCEDURE: MIPS QUALITY
Quality 226: Preventive Care And Screening: Tobacco Use: Screening And Cessation Intervention: Patient screened for tobacco use, is a smoker AND did not receive Cessation Counseling during measurement period or in the six months prior to the measurement period
Detail Level: Detailed
Quality 130: Documentation Of Current Medications In The Medical Record: Current Medications Documented
Quality 431: Preventive Care And Screening: Unhealthy Alcohol Use - Screening: Patient not identified as an unhealthy alcohol user when screened for unhealthy alcohol use using a systematic screening method

## 2023-03-06 NOTE — PROCEDURE: DEFER
Introduction Text (Please End With A Colon): The following was deferred:
Procedure To Be Performed At Next Visit: Biopsy by shave method
Size Of Lesion In Cm (Optional): 0
Detail Level: Zone
Procedure To Be Performed At Next Visit: Shave Removal

## 2023-04-06 ENCOUNTER — APPOINTMENT (RX ONLY)
Dept: URBAN - METROPOLITAN AREA CLINIC 374 | Facility: CLINIC | Age: 38
Setting detail: DERMATOLOGY
End: 2023-04-06

## 2023-04-06 DIAGNOSIS — D22 MELANOCYTIC NEVI: ICD-10-CM

## 2023-04-06 DIAGNOSIS — D485 NEOPLASM OF UNCERTAIN BEHAVIOR OF SKIN: ICD-10-CM

## 2023-04-06 PROBLEM — D22.5 MELANOCYTIC NEVI OF TRUNK: Status: ACTIVE | Noted: 2023-04-06

## 2023-04-06 PROBLEM — D48.5 NEOPLASM OF UNCERTAIN BEHAVIOR OF SKIN: Status: ACTIVE | Noted: 2023-04-06

## 2023-04-06 PROCEDURE — ? SHAVE REMOVAL

## 2023-04-06 PROCEDURE — ? PHOTO-DOCUMENTATION

## 2023-04-06 PROCEDURE — 11300 SHAVE SKIN LESION 0.5 CM/<: CPT

## 2023-04-06 PROCEDURE — 11303 SHAVE SKIN LESION >2.0 CM: CPT

## 2023-04-06 ASSESSMENT — LOCATION DETAILED DESCRIPTION DERM
LOCATION DETAILED: LEFT DISTAL POSTERIOR UPPER ARM
LOCATION DETAILED: INFERIOR THORACIC SPINE

## 2023-04-06 ASSESSMENT — LOCATION SIMPLE DESCRIPTION DERM
LOCATION SIMPLE: UPPER BACK
LOCATION SIMPLE: LEFT POSTERIOR UPPER ARM

## 2023-04-06 ASSESSMENT — LOCATION ZONE DERM
LOCATION ZONE: ARM
LOCATION ZONE: TRUNK

## 2023-04-06 NOTE — PROCEDURE: SHAVE REMOVAL
Medical Necessity Information: It is in your best interest to select a reason for this procedure from the list below. All of these items fulfill various CMS LCD requirements except the new and changing color options.
Medical Necessity Clause: This procedure was medically necessary because the lesion that was treated was:
Lab: -[id number]
Lab Facility: 0
Detail Level: Detailed
Was A Bandage Applied: Yes
Size Of Lesion In Cm (Required): 0.5
Depth Of Shave: dermis
Biopsy Method: Dermablade
Anesthesia Type: 1% lidocaine with epinephrine
Hemostasis: Aluminum Chloride and Electrocautery
Wound Care: Vaseline
Path Notes (To The Dermatopathologist): Please check margins.
Render Path Notes In Note?: No
Consent was obtained from the patient. The risks and benefits to therapy were discussed in detail. Specifically, the risks of infection, scarring, bleeding, prolonged wound healing, incomplete removal, allergy to anesthesia, nerve injury and recurrence were addressed. Prior to the procedure, the treatment site was clearly identified and confirmed by the patient. All components of Universal Protocol/PAUSE Rule completed.
Post-Care Instructions: I reviewed with the patient in detail post-care instructions. Patient is to keep the biopsy site dry overnight, and then apply bacitracin twice daily until healed. Patient may apply hydrogen peroxide soaks to remove any crusting.
Notification Instructions: Patient will be notified of pathology results. However, patient instructed to call the office if not contacted within 2 weeks.
Billing Type: Third-Party Bill
Size Of Lesion In Cm (Required): 2.2

## 2023-09-26 ENCOUNTER — OFFICE VISIT (OUTPATIENT)
Dept: ENDOCRINOLOGY | Facility: CLINIC | Age: 38
End: 2023-09-26
Payer: COMMERCIAL

## 2023-09-26 VITALS
OXYGEN SATURATION: 99 % | HEART RATE: 69 BPM | BODY MASS INDEX: 24.97 KG/M2 | WEIGHT: 200.8 LBS | HEIGHT: 75 IN | RESPIRATION RATE: 16 BRPM | DIASTOLIC BLOOD PRESSURE: 75 MMHG | SYSTOLIC BLOOD PRESSURE: 130 MMHG

## 2023-09-26 DIAGNOSIS — R79.89 LOW TESTOSTERONE IN MALE: Primary | ICD-10-CM

## 2023-09-26 PROCEDURE — 3008F BODY MASS INDEX DOCD: CPT | Performed by: INTERNAL MEDICINE

## 2023-09-26 PROCEDURE — 3075F SYST BP GE 130 - 139MM HG: CPT | Performed by: INTERNAL MEDICINE

## 2023-09-26 PROCEDURE — 3078F DIAST BP <80 MM HG: CPT | Performed by: INTERNAL MEDICINE

## 2023-09-26 PROCEDURE — 99203 OFFICE O/P NEW LOW 30 MIN: CPT | Performed by: INTERNAL MEDICINE

## 2023-09-26 RX ORDER — TIZANIDINE 2 MG/1
2 TABLET ORAL EVERY 6 HOURS PRN
COMMUNITY

## 2023-09-26 NOTE — PROGRESS NOTES
"       Endocrinology Consultation Report     Patient Name: En Gonsales  MR#: [id number]  : 1985  Consultant: Anila Godinez MD      En Gonsales is a 38 y.o. male who presents for evaluation for low testosterone      HPI:  Patient has been well until last year.  He started to experience mood swing, low energy, muscle pain joint aches.  She was diagnosed with Lyme's disease several months later.  Went through three courses of antibiotics.  He felt better but still symptomatic.  He reports insomnia. \"sore all over, joints on fire.\"  As part of work-up, he was also found to have low testosterone and high prolactin level..  He was also taking gabapentin at a time.  Prolactin level came down to normal range after he stopped gabapentin..     Used to have severe migraine in the past. It resolved and he had no recurrence.  He has mild headache intermittently now.  Denies any visual changes. Had a history of anemia, related to iron deficiency.  He attributed to hemorrhoids bleeding.  It has normalized.  He has colonoscopy next week    Patient reports low libido for the past year.  Denies erectile dysfunction.  He has the same sexual partner for long time who has children.  He never tried to have his own child.   Patient reports intentional weight loss..  Denies any hair changes. Denies  supplement use history. Denies steroids, narcortics. Denies alcohol or illicit drugs. Denies hemochromotosis.     Medical History:  Past Medical History:   Diagnosis Date    GERD (gastroesophageal reflux disease)     Hypertension     Lyme disease     Migraine        Surgical History:   Past Surgical History:   Procedure Laterality Date    ADENOIDECTOMY      APPENDECTOMY      FEM-POP BYPASS      HERNIA REPAIR      SHOULDER SURGERY      TONSILLECTOMY         Family History:  Family History   Problem Relation Age of Onset    Diverticulitis Other     Hypertension Other        Social history:  Social History "     Tobacco Use    Smoking status: Former     Types: Cigarettes    Smokeless tobacco: Never    Tobacco comments:     Quit 1 year ago    Vaping Use    Vaping Use: Never used   Substance Use Topics    Alcohol use: Yes     Comment: Very rare    Drug use: Yes     Types: Marijuana     Comment: last smoke yesterday abd uses CBD       Medication(active prior to today):  Current Outpatient Medications   Medication Sig Dispense Refill    tiZANidine (ZANAFLEX) 2 mg tablet Take 2 mg by mouth every 6 (six) hours as needed for muscle spasms.      dicyclomine (BENTYL) 20 mg tablet Take 1 tablet (20 mg total) by mouth 2 (two) times a day. (Patient not taking: Reported on 7/16/2020 ) 20 tablet 0    ferrous sulfate 325 mg (65 mg iron) tablet Take 1 tablet (325 mg total) by mouth 2 (two) times a day with meals. 60 tablet 0    hydrocortisone-pramoxine (ANALPRAM-HC) 2.5-1 % rectal cream Insert into the rectum 4 (four) times a day as needed for hemorrhoids for up to 10 days. 30 g 0    lisinopriL (PRINIVIL) 10 mg tablet Take 10 mg by mouth once daily.      loperamide (IMODIUM) 2 mg capsule Take 1 capsule (2 mg total) by mouth 4 (four) times a day as needed for diarrhea for up to 10 days. 30 capsule 0    ondansetron ODT (ZOFRAN ODT) 4 mg disintegrating tablet Take 1 tablet (4 mg total) by mouth every 8 (eight) hours as needed for nausea or vomiting for up to 7 days. 12 tablet 0    pantoprazole (PROTONIX) 40 mg EC tablet Take 1 tablet (40 mg total) by mouth daily. 30 tablet 0    pantoprazole (PROTONIX) 40 mg EC tablet Take 1 tablet (40 mg total) by mouth daily. 90 tablet 3     No current facility-administered medications for this visit.       Allergies:  Benadryl [diphenhydramine hcl]    ROS:  All other systems reviewed and negative except as noted in HPI    PE:  Vitals:    09/26/23 1024   BP: 130/75   BP Location: Right upper arm   Patient Position: Sitting   Pulse: 69   Resp: 16   SpO2: 99%   Weight: 91.1 kg (200 lb 12.8  "oz)   Height: 1.905 m (6' 3\")     Body mass index is 25.1 kg/m².    Constitutional: well-developed. well- nourished  HENT: Head: Normocephalic and atraumatic.   Eyes: Conjunctivae and EOM are normal. PERRL  Neck: Normal range of motion. Neck supple. No JVD. No thyromegaly present.   Cardiovascular: Normal rate, regular rhythm and normal heart sounds.    Pulmonary/Chest: Effort normal and breath sounds normal.   Abdominal: Soft. Bowel sounds are normal. There is no tenderness, no rebound and no guarding.   Musculoskeletal: No edema or deformity. No clubbing  Neurological: Alert and oriented to person, place, and time. No cranial nerve deficit. Coordination normal.   Skin: Skin is warm. No rash noted. No erythema.   Psychiatric: Normal mood and affect. Judgment normal.     Labs:   No results found for: \"HGBA1C\"       Lab Results   Component Value Date    GLUCOSE 90 04/11/2020    CALCIUM 8.9 04/11/2020     04/11/2020    K 3.6 04/11/2020    CO2 23 04/11/2020     04/11/2020    BUN 8 04/11/2020    CREATININE 0.9 04/11/2020       Lab Results   Component Value Date    ALT 13 (L) 04/09/2020    AST 17 04/09/2020    ALKPHOS 44 04/09/2020    BILITOT 0.9 04/09/2020       No results found for: \"TSH\"  No results found for: \"FREET4\", \"Q1YVXWQBM\", \"T4F\"  No results found for: \"K9TPORA\", \"T3FREE\"  No results found for: \"TSI\"  Lab Results   Component Value Date    CALCIUM 8.9 04/11/2020    PHOS 2.9 04/08/2020     No components found for: \"VITD5\"  7/2023 TSH wnl. Negative celiac disease.   From his lab account:   6/2023 normal CMP except anion gap 15 CO2 19.4 glucose 106 creatinine 1.10 calcium 10.2 WBC 15.2 RBC 5.16 hemoglobin 16.2 45.9 platelet 287 6/9/2023 prolactin 10.5 5/17/2023 prolactin 71.7 5/2023 total testosterone 376 Free testosterone 2.6 LH 2.6 FSH 2.6  Images:   No brain imaging    ASSESSMENT/Plan:   1, testosterone  -Not sure whether the lab was collected at the right time.   -Clinically patient has vague " symptoms with fatigue insomnia, low energy, low libido.     -Repeat a.m. fasting lab for testosterone, FSH, LH  -If patient truly has secondary hypogonadism, I will order pituitary imaging.   -He has history of elevated prolactin, which was thought to be related to medication.  We will repeat    -If patient needs testosterone treatment, he prefers patch or gel.  He is aware that testosterone treatment will suppress sperm production.     RTC in 6 months    Orders Placed This Encounter   Procedures    FSH/LH     Order Specific Question:   Release to patient     Answer:   Immediate [1]    Testosterone, Free, Bioavailable and Total     Order Specific Question:   Release to patient     Answer:   Immediate [1]    TSH w reflex FT4     Order Specific Question:   Release to patient     Answer:   Immediate [1]    Prolactin     Order Specific Question:   Release to patient     Answer:   Immediate [1]    Sex Hormone Binding Globulin     Order Specific Question:   Release to patient     Answer:   Immediate [1]    PSA     Order Specific Question:   Release to patient     Answer:   Immediate [1]    Vitamin D 25 hydroxy     Order Specific Question:   Release to patient     Answer:   Immediate     Order Specific Question:   Release to patient     Answer:   Immediate [1]         Thank you very much for allowing me participating in the patient's care. Please feel free to contact me if any questions.     Electronically signed by Anila Godinez MD

## 2023-09-26 NOTE — LETTER
"2023     Serene Patel, DO  [address]  [address]  [facility] [address]    Patient: En Gonsales  YOB: 1985  Date of Visit: 2023      Dear Dr. Patel:    Thank you for referring En Gonsales to me for evaluation. Below are my notes for this consultation.    If you have questions, please do not hesitate to call me. I look forward to following your patient along with you.         Sincerely,        Anila Godinez MD        CC: No Recipients    Anila Godinez MD  2023  4:24 PM  Signed         Endocrinology Consultation Report     Patient Name: En Gonsales  MR#: [id number]  : 1985  Consultant: Anila Godinez MD      En Gonsales is a 38 y.o. male who presents for evaluation for low testosterone      HPI:  Patient has been well until last year.  He started to experience mood swing, low energy, muscle pain joint aches.  She was diagnosed with Lyme's disease several months later.  Went through three courses of antibiotics.  He felt better but still symptomatic.  He reports insomnia. \"sore all over, joints on fire.\"  As part of work-up, he was also found to have low testosterone and high prolactin level..  He was also taking gabapentin at a time.  Prolactin level came down to normal range after he stopped gabapentin..     Used to have severe migraine in the past. It resolved and he had no recurrence.  He has mild headache intermittently now.  Denies any visual changes. Had a history of anemia, related to iron deficiency.  He attributed to hemorrhoids bleeding.  It has normalized.  He has colonoscopy next week    Patient reports low libido for the past year.  Denies erectile dysfunction.  He has the same sexual partner for long time who has children.  He never tried to have his own child.   Patient reports intentional weight loss..  Denies any hair changes. Denies  supplement use history. Denies steroids, narcortics. Denies alcohol or illicit drugs. " Denies hemochromotosis.     Medical History:  Past Medical History:   Diagnosis Date    GERD (gastroesophageal reflux disease)     Hypertension     Lyme disease     Migraine        Surgical History:   Past Surgical History:   Procedure Laterality Date    ADENOIDECTOMY      APPENDECTOMY      FEM-POP BYPASS      HERNIA REPAIR      SHOULDER SURGERY      TONSILLECTOMY         Family History:  Family History   Problem Relation Age of Onset    Diverticulitis Other     Hypertension Other        Social history:  Social History     Tobacco Use    Smoking status: Former     Types: Cigarettes    Smokeless tobacco: Never    Tobacco comments:     Quit 1 year ago    Vaping Use    Vaping Use: Never used   Substance Use Topics    Alcohol use: Yes     Comment: Very rare    Drug use: Yes     Types: Marijuana     Comment: last smoke yesterday abd uses CBD       Medication(active prior to today):  Current Outpatient Medications   Medication Sig Dispense Refill    tiZANidine (ZANAFLEX) 2 mg tablet Take 2 mg by mouth every 6 (six) hours as needed for muscle spasms.      dicyclomine (BENTYL) 20 mg tablet Take 1 tablet (20 mg total) by mouth 2 (two) times a day. (Patient not taking: Reported on 7/16/2020 ) 20 tablet 0    ferrous sulfate 325 mg (65 mg iron) tablet Take 1 tablet (325 mg total) by mouth 2 (two) times a day with meals. 60 tablet 0    hydrocortisone-pramoxine (ANALPRAM-HC) 2.5-1 % rectal cream Insert into the rectum 4 (four) times a day as needed for hemorrhoids for up to 10 days. 30 g 0    lisinopriL (PRINIVIL) 10 mg tablet Take 10 mg by mouth once daily.      loperamide (IMODIUM) 2 mg capsule Take 1 capsule (2 mg total) by mouth 4 (four) times a day as needed for diarrhea for up to 10 days. 30 capsule 0    ondansetron ODT (ZOFRAN ODT) 4 mg disintegrating tablet Take 1 tablet (4 mg total) by mouth every 8 (eight) hours as needed for nausea or vomiting for up to 7 days. 12 tablet 0    pantoprazole  "(PROTONIX) 40 mg EC tablet Take 1 tablet (40 mg total) by mouth daily. 30 tablet 0    pantoprazole (PROTONIX) 40 mg EC tablet Take 1 tablet (40 mg total) by mouth daily. 90 tablet 3     No current facility-administered medications for this visit.       Allergies:  Benadryl [diphenhydramine hcl]    ROS:  All other systems reviewed and negative except as noted in HPI    PE:  Vitals:    09/26/23 1024   BP: 130/75   BP Location: Right upper arm   Patient Position: Sitting   Pulse: 69   Resp: 16   SpO2: 99%   Weight: 91.1 kg (200 lb 12.8 oz)   Height: 1.905 m (6' 3\")     Body mass index is 25.1 kg/m².    Constitutional: well-developed. well- nourished  HENT: Head: Normocephalic and atraumatic.   Eyes: Conjunctivae and EOM are normal. PERRL  Neck: Normal range of motion. Neck supple. No JVD. No thyromegaly present.   Cardiovascular: Normal rate, regular rhythm and normal heart sounds.    Pulmonary/Chest: Effort normal and breath sounds normal.   Abdominal: Soft. Bowel sounds are normal. There is no tenderness, no rebound and no guarding.   Musculoskeletal: No edema or deformity. No clubbing  Neurological: Alert and oriented to person, place, and time. No cranial nerve deficit. Coordination normal.   Skin: Skin is warm. No rash noted. No erythema.   Psychiatric: Normal mood and affect. Judgment normal.     Labs:   No results found for: \"HGBA1C\"       Lab Results   Component Value Date    GLUCOSE 90 04/11/2020    CALCIUM 8.9 04/11/2020     04/11/2020    K 3.6 04/11/2020    CO2 23 04/11/2020     04/11/2020    BUN 8 04/11/2020    CREATININE 0.9 04/11/2020       Lab Results   Component Value Date    ALT 13 (L) 04/09/2020    AST 17 04/09/2020    ALKPHOS 44 04/09/2020    BILITOT 0.9 04/09/2020       No results found for: \"TSH\"  No results found for: \"FREET4\", \"H2OSMYVAK\", \"T4F\"  No results found for: \"D0DYPYD\", \"T3FREE\"  No results found for: \"TSI\"  Lab Results   Component Value Date    CALCIUM 8.9 04/11/2020    " "PHOS 2.9 04/08/2020     No components found for: \"VITD5\"  7/2023 TSH wnl. Negative celiac disease.   From his lab account:   6/2023 normal CMP except anion gap 15 CO2 19.4 glucose 106 creatinine 1.10 calcium 10.2 WBC 15.2 RBC 5.16 hemoglobin 16.2 45.9 platelet 287 6/9/2023 prolactin 10.5 5/17/2023 prolactin 71.7 5/2023 total testosterone 376 Free testosterone 2.6 LH 2.6 FSH 2.6  Images:   No brain imaging    ASSESSMENT/Plan:   1, testosterone  -Not sure whether the lab was collected at the right time.   -Clinically patient has vague symptoms with fatigue insomnia, low energy, low libido.     -Repeat a.m. fasting lab for testosterone, FSH, LH  -If patient truly has secondary hypogonadism, I will order pituitary imaging.   -He has history of elevated prolactin, which was thought to be related to medication.  We will repeat    -If patient needs testosterone treatment, he prefers patch or gel.  He is aware that testosterone treatment will suppress sperm production.     RTC in 6 months    Orders Placed This Encounter   Procedures    FSH/LH     Order Specific Question:   Release to patient     Answer:   Immediate [1]    Testosterone, Free, Bioavailable and Total     Order Specific Question:   Release to patient     Answer:   Immediate [1]    TSH w reflex FT4     Order Specific Question:   Release to patient     Answer:   Immediate [1]    Prolactin     Order Specific Question:   Release to patient     Answer:   Immediate [1]    Sex Hormone Binding Globulin     Order Specific Question:   Release to patient     Answer:   Immediate [1]    PSA     Order Specific Question:   Release to patient     Answer:   Immediate [1]    Vitamin D 25 hydroxy     Order Specific Question:   Release to patient     Answer:   Immediate     Order Specific Question:   Release to patient     Answer:   Immediate [1]         Thank you very much for allowing me participating in the patient's care. Please feel free to contact me if any questions. "     Electronically signed by Anila Godinez MD

## 2023-10-06 LAB
25(OH)D3+25(OH)D2 SERPL-MCNC: 18.2 NG/ML (ref 30–100)
FSH SERPL-ACNC: 3.3 MIU/ML (ref 1.5–12.4)
LH SERPL-ACNC: 3.3 MIU/ML (ref 1.7–8.6)
PROLACTIN SERPL-MCNC: 17.4 NG/ML (ref 4–15.2)
PSA SERPL-MCNC: 0.7 NG/ML (ref 0–4)
SHBG SERPL-SCNC: 44.7 NMOL/L (ref 16.5–55.9)
T4 FREE SERPL-MCNC: 1.25 NG/DL (ref 0.82–1.77)
TSH SERPL DL<=0.005 MIU/L-ACNC: 2.86 UIU/ML (ref 0.45–4.5)

## 2023-10-10 ENCOUNTER — TELEPHONE (OUTPATIENT)
Dept: ENDOCRINOLOGY | Facility: CLINIC | Age: 38
End: 2023-10-10
Payer: COMMERCIAL

## 2023-10-10 LAB
TESTOST SERPL-MCNC: 435 NG/DL (ref 264–916)
TESTOSTERONE.FREE+WB MFR SERPL: 24.6 % (ref 9–46)
TESTOSTERONE.FREE+WB SERPL-MCNC: 107 NG/DL (ref 40–250)

## 2023-10-11 NOTE — TELEPHONE ENCOUNTER
Office Visit on 09/26/2023   Component Date Value Ref Range Status    LH 10/05/2023 3.3  1.7 - 8.6 mIU/mL Final    FSH 10/05/2023 3.3  1.5 - 12.4 mIU/mL Final    Testosterone, Serum 10/05/2023 435  264 - 916 ng/dL Final    Comment: Adult male reference interval is based on a population of  healthy nonobese males (BMI <30) between 19 and 39 years old.  Donald et.al. JCEM 2017,102;1968-8220. PMID: 44145866.      Testost., % Free+Weakly Bound 10/05/2023 24.6  9.0 - 46.0 % Final    Comment: This test was developed and its performance characteristics  determined by [facility]. It has not been cleared or approved  by the Food and Drug Administration.      Testost., F+W Bound 10/05/2023 107.0  40.0 - 250.0 ng/dL Final    TSH 10/05/2023 2.860  0.450 - 4.500 uIU/mL Final    T4,Free(Direct) 10/05/2023 1.25  0.82 - 1.77 ng/dL Final    Prolactin 10/05/2023 17.4 (H)  4.0 - 15.2 ng/mL Final    Sex Horm Binding Glob, Serum 10/05/2023 44.7  16.5 - 55.9 nmol/L Final    Prostate Specific Ag, Serum 10/05/2023 0.7  0.0 - 4.0 ng/mL Final    Comment: Roche ECLIA methodology.  According to the American Urological Association, Serum PSA should  decrease and remain at undetectable levels after radical  prostatectomy. The AUA defines biochemical recurrence as an initial  PSA value 0.2 ng/mL or greater followed by a subsequent confirmatory  PSA value 0.2 ng/mL or greater.  Values obtained with different assay methods or kits cannot be used  interchangeably. Results cannot be interpreted as absolute evidence  of the presence or absence of malignant disease.      Vitamin D, 25-Hydroxy 10/05/2023 18.2 (L)  30.0 - 100.0 ng/mL Final    Comment: Vitamin D deficiency has been defined by the [place] of  Medicine and an Endocrine Society practice guideline as a  level of serum 25-OH vitamin D less than 20 ng/mL (1,2).  The Endocrine Society went on to further define vitamin D  insufficiency as a level between 21 and 29 ng/mL (2).  1. IOM  ([place] of Medicine). 2010. Dietary reference     intakes for calcium and D. [place]: The     National Academies Press.  2. Jeffrey MF, Narcisa NC, Nidia NAVARRO, et al.     Evaluation, treatment, and prevention of vitamin D     deficiency: an Endocrine Society clinical practice     guideline. JCEM. 2011 Jul; 96(7):1911-30.       pls call   Testosterone is normal. No  Need for treatment.   PRL level is slightly high.   He had history of high PRL which normalized after discontinuing gabapentin.  I am not keen to order pituitary MRI.   Vit D is low. pls order vit D 50,000 once weekly.   We can repeat PRL level and vit D in 6 months.   pls put in orders. thx

## 2023-10-12 NOTE — PATIENT INSTRUCTIONS
Please message us via My Chart with any questions or concerns.    Vitamin D deficiency: Start taking vitamin D 50,000 international units once a week  10/5/2023-18.2    Cognitive concerns: (Question possible ADD)  - Refer patient to psychology for evaluation.  Gissell Darden PsyD  [facility]   Phone: [phone]   Location  [address] [address]    Dr. Aranza Rico, PhD  Office:   [facility] on Fridays.  [address] [place] [address] [address], [place] [address] (except Fridays)  Mailing address: [address] [place] [address] [address], [place] [address]  Phone: [phone]  [email or web address]    Neuropsychological evaluation at [facility]  Office:  [facility]  [address] [address]  Phone: [phone]     Flori Tejeda  Office:   [address] [place] [address] [address] [address]  Phone: [phone]    Bettina Sanders PhD  Office:   [address] [facility], [address]  [address] [address] [address]  Phone: [phone]    Teja Arguelles, PhD  Phone: [phone]  [email or web address]    Depression/anxiety:  Please call below numbers to see if he can get a therapist to work with you.  [facility] - Outpatient Psychiatry services:  Number: [phone]-extension 1 for out/inpt, scheduling and then extension 4 for out pt scheduling.    [facility]:   Address: [address], [address]  Phone: [phone]    Below referrals for Psychiatry and counseling that work with Medicare:    [facility]   Located in [place], offers in person sessions & telehealth   [place] [phone]  Website: [email or web address] & Email: [email or web address] [email or web address]  Has several locations in [place], [place] & [place] and offers virtual apts. **At least month wait**   Phone: [phone]   Website: [email or web address]     [facility]  Located in [place] and is offering telehealth only at this time (so you  would not need to travel for apts)  Phone: [phone]   Website: [email or web address]     [facility]   Locations: [place], [place] & [place] plus virtual counseling.  Accepts most commercial & medicare  Phone: [phone]   Website: [email or web address]     [facility]   Located in [place], [place], offers telehealth & in person, is accepting new patients. Call or online intake forms to get started.  **Soon appointments**  Phone: [phone]    Website: [email or web address]       Chronic daily headache:  Migraine headache with and without aura:  Preventive therapy for headaches:   Recommendation of nutraceuticals, to use for migraine headaches:   - Magnesium Oxide 200 mg at bedtime for 1 week then 400 mg at bedtime after that.  If any diarrhea or upset stomach, decrease dose as tolerated  - Vitamin B 2 (riboflavin) 100 mg in a.m. for 1 week then 200 mg in a.m. after that. Two minor adverse reactions reported: diarrhea and increased urine frequency.  May cause the urine to turn yellow which is normal for B 2 to do and is not a sign that you are dehydrated. May order on line, as it comes in 400 mg capsules.  - CoQ 1000 mg 1 tab in a.m. daily  Abortive therapy for headaches:   -We will hold off on any abortive medication at this time.  -Consider Nerivio:   - It is a  wireless remote electrical neuromodulation device for treatment of migraine with or without aura in patients 14 years of age or older. This prescription device is self-applied to the upper-arm and should be used in the home environment at the onset of migraine headache or aura.  Is it covered by my insurance?  - The company will try to get this approved through your insurance company and if not approved. Then for the first time, 12 treatments will cost you 10 dollars.  Then after that its $50 for each 12 - 18 treatments.   We will send out a prescription to the pharmacy.  They will call or text you before the device is mailed  "to you.  If you do not hear back from them within 5 to 7 days.  Please call this number: [phone].    Headache management instructions  - When patient has a moderate to severe headache, they should seek rest, initiate relaxation and apply cold compresses to the head.   - Maintain regular sleep schedule. Adults need at least 7-8 hours of uninterrupted sleep, per night.   - Limit over the counter medications such as Tylenol, Ibuprofen, Aleve, Excedrin. (No more than 2- 3 times a week or max 10 a month).  - Maintain headache diary.  Free NIKITA for a smart phone, which can be used is \"Migraine eddie\"  - Limit caffeine to 1-2 cups, 8 to 16 oz a day or less.  - Avoid dietary trigger. (aged cheese, peanuts, MSG, aspartame and nitrates).  - Patient is to have regular frequent meals to prevent headache onset.    - Please drink at least 64 ounces of water a day, to help remain hydrated.    Cervicalgia (neck pain):  Bruxism  - We will refer to physical therapy for evaluation and treatment  Basic neck exercises for daily use:    - Neck pathology and poor posture, with straightening of the normal cervical lordosis, can cause headaches.  Tightening of the neck muscles can irritate the nerves in the occipital region of the head and cause or worsen head pain. Thus neck strengthening and relaxation exercises, can help improve this particular pain. It is importance to have good posture for improving shoulder, neck, and head pain.    - Here are some exercises which should take 5 minutes:     1. Standing, drop your head to one side while continuing to look ahead. Hold for 10 seconds then swap sides. Repeat twice more each side. To increase the stretch, drop the opposite shoulder.    2. Standing again, lower your chin to your chest, hold for 10 and then look up to the ceiling and hold for 10. Repeat twice more.     3. Next, standing straight again, look over your right shoulder and hold firm for 10 seconds, then over your left " shoulder for 10. Repeat this 3 times.     4. Finally, while sitting upright, bring your head forward and hold for 10, then all the way back and hold for 10.    If this simple exercise does not help improve the posture, we will consider formal physical therapy in the future.     Cognitive behavioral therapy (CBT):  - Is a common type of talk therapy (psychotherapy) were you work with a psychotherapist or therapist . CBT helps you become aware of inaccurate or negative thinking so you can view challenging situations more clearly and respond to them in a more effective way. CBT can be a very helpful tool either alone or in combination with other therapies in treating mental health disorders and chronic pain. CBT can be an effective tool to help anyone learn how to better manage stressful life situations and pain. In some cases, CBT is most effective when it's combined with other treatments, such as antidepressants or other medications.  You can start yourself by down loading the jim: Curable    Here are a few websites on which you can find certified cognitive behavioral therapists:  Academyofct.org (Academy of cognitive therapists)  Adaa.org (Anxiety and depression association of [place])  Abct.org (associated for behavioral and cognitive therapy)    Mindfulness/Meditation:  - Many people believe that stress is a major trigger for their pain. This is where mindfulness and meditation can come into play, as they have been known to help reduce migraine severity, duration and acute pain medication use. It may also help to relieve stress and anxiety while improving feelings of well being.    Biofeedback:   - Involves becoming more aware of the changes that occur in the body and learning how to exert control over generally involuntary functions. Biofeedback allows you to see your vitals in real-time and learn how to stabilize them on your own. There is great evidence that biofeedback can reduce the frequency, intensity, and  duration of pain.   When you're stressed, you may notice elevated heart rates, tightened muscles, and sweating.  During biofeedback, you can see these changes on a monitor, then a therapist teaches you exercises to help manage these changes.    Yoga/Soto Chi:  - The kind of mind/body therapy that yoga can provide may help create relief from pain. Keeping up with yoga consistently can reduce headache frequency, intensity and duration, so it's important to practice regularly if you plan to use it as a complementary migraine treatment. However, certain types of yoga such as  hot yoga   may be uncomfortable for people with migraine. Others, such as  restorative yoga, may be tolerable even for a patient with chronic migraine.  Soto Chi can also have a similar benefit for patients with migraine. Specifically, it can help improve balance, which can be very useful for those with vestibular symptoms or vestibular migraine.    Acupuncture:  - A traditional Chinese medicine, acupuncture is reported to increase the release of serotonin, dopamine and other chemicals that may help to treat chronic pain, and can be helpful in preventing episodic migraine. There are, however, conflicting results on studies in acupuncture as a treatment for migraine.    Exercising:    - Regular exercise can reduce the frequency and intensity pain. When one exercises, the body releases endorphins, which are the body's natural painkillers. Exercise reduces stress and helps individuals to sleep at night. Exercising at least 30 to 40 minutes 3 times a week is sufficient for most patients.   When exercising, follow this plan:  - First, stay hydrated before, during, and after exercise.    - Second part of the exercise plan is to eat sufficient food about an hour and a half before you exercise. Exercise causes one's blood sugar level to decrease, and it is important to have a source of energy.   - Final part of the exercise plan is to warm-up. Do not  jump into sudden, vigorous exercise if that triggers a headache or migraine.       To read more go to: https://americanmigrainefoundation.org/resource-library/effects-of-exercise-on-headaches-and-migraines

## 2023-10-12 NOTE — PROGRESS NOTES
[facility]   Headache Center  Rosi Bedoya MD  [address] [facility] ([address])  [place] [address]       Patient ID: En Gonsales    : 1985  MRN: [id number]                                            Visit Date: 10/13/2023  Encounter Provider: Rosi Bedoya  Referring Provider: No ref. provider found           Assessment/Plan   Problem List Items Addressed This Visit        Nervous    Cervicalgia    Relevant Orders    Ambulatory referral to Physical Therapy    Chronic daily headache - Primary    Relevant Medications    magnesium oxide (MAG-OX) 400 mg (241.3 mg magnesium) tablet    riboflavin, vitamin B2, (VITAMIN B-2) 100 mg tablet    coenzyme Q10 (CO Q-10) 100 mg capsule    Other Relevant Orders    MRI BRAIN WITH AND WITHOUT CONTRAST    BUN    Creatinine, serum    Ambulatory referral to Physical Therapy       Mental Health    Anxiety and depression    Bruxism       Other    Cognitive impairment    Relevant Orders    MRI BRAIN WITH AND WITHOUT CONTRAST    BUN    Creatinine, serum    Ambulatory referral to Psychology    Migraine with aura and without status migrainosus, not intractable    Relevant Medications    magnesium oxide (MAG-OX) 400 mg (241.3 mg magnesium) tablet    riboflavin, vitamin B2, (VITAMIN B-2) 100 mg tablet    coenzyme Q10 (CO Q-10) 100 mg capsule    Other Relevant Orders    MRI BRAIN WITH AND WITHOUT CONTRAST    BUN    Creatinine, serum   Other Visit Diagnoses     Vitamin B12 deficiency        Relevant Orders    Vitamin B12    Vitamin D deficiency        Relevant Medications    ergocalciferol (VITAMIN D2) 50,000 unit(1250 mcg) capsule          Please message us via My Chart with any questions or concerns.    Vitamin D deficiency: Start taking vitamin D 50,000 international units once a week  10/5/2023-18.2    Cognitive concerns: (Question possible ADD)  - Refer patient to psychology for evaluation.    Depression anxiety:  - We will refer to therapy.   Information given to the patient    Chronic daily headache:  Migraine headache with and without aura:  Preventive therapy for headaches:   Recommendation of nutraceuticals, to use for migraine headaches:   - Magnesium Oxide 200 mg at bedtime for 1 week then 400 mg at bedtime after that.  If any diarrhea or upset stomach, decrease dose as tolerated  - Vitamin B 2 (riboflavin) 100 mg in a.m. for 1 week then 200 mg in a.m. after that. Two minor adverse reactions reported: diarrhea and increased urine frequency.  May cause the urine to turn yellow which is normal for B 2 to do and is not a sign that you are dehydrated. May order on line, as it comes in 400 mg capsules.  - CoQ 1000 mg 1 tab in a.m. daily  Abortive therapy for headaches:   -We will hold off on any abortive medication at this time.  -Consider Nerivio:     Cervicalgia (neck pain):  Bruxism  - We will refer to physical therapy for evaluation and treatment  -Basic neck exercises given to patient to use on daily basis      Return in about 3 months (around 1/13/2024).         _________________________________________________________________      Chief Complaint: Headache      Subjective     We had the pleasure of evaluating En Gonsales in neurological consultation today. As you know he  is a 38 y.o.  years old  right handed male.  he is here today for evaluation of migraine headaches.  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  Personal history: In relationship and has 2 step kids    Medical history review:   QTC: 4/8/2020-444  Tobacco use: Yes quit smoking was smoking very rarely in the past  Vaping: None  THC: Yes-occasionally  Alcohol use: Yes but rare  Daily Caffeine intake?  Rare  Daily water intake?  48 oz  Elevated prolactin level: 5/17/2023 31.7  Iron deficiency anemia  History of Lyme disease  Long COVID  History of astigmatism  History of sleep apnea status post surgery  History of multiple head injuries when patient was  younger.  Hypertension  BP Readings from Last 3 Encounters:   10/13/23 140/82   09/26/23 130/75   02/24/22 120/68       Psychiatry history review:   Reports increased stress.  States his father passed away last year which was very stressful for him.  Also multiple family members that are sick.  Patient also is looking for a job which has been stressful for him.  Depression: yes -but currently not seeing a psychiatrist.  States he does more meditation or yoga.  Did see a psychiatrist when he was younger.  Anxiety: yes  Seeing a psychiatrist: yes in the past  Seeing at therapist: yes in the past    Reports multiple other neurology concerns:  - States has problem with concentration.  At times stares out into space.  - Has problems with spelling and was told that he also may have ADD.   Does tend to be very restless.  But not officially diagnosed by a psychologist or a physician.  - Reports having tics in his upper extremity and a time in his lower extremity.  Worse over the last few months.  Symptoms initially started about 3 years ago.  -Numbness and tingling in his hands and feet bilaterally.  - Overall feels fatigued and at times feels that his upper extremities can become stiff and weak and has poor control in his upper extremity.    Headache/pain history:  Family history of migraines?  No  Family history of aneurysms?  No  Family history of any other neurological disease?  No    Have you seen someone else for headaches or pain?  No    Symptoms started at what age?  Started in his teens, in his 20's to 30's headaches were pretty intense.  Then they did improve until last few years.      What is your current pain level?   Headache:2/10  Neck pain:4/10    How often do the symptoms occur?   Mild headaches: Daily since high school  Moderate to severe headaches: once a week  Neck pain/soreness: Daily - since high school    Are you ever symptom free?  Mild headache and neck pain is there all the time    What is the  intensity of symptoms?   Mild headaches: 2 out of 10  Moderate to severe headaches: 5-7 out of 10  Neck pain: 4 out of 10    How long do the symptoms last?   Mild headaches: baseline pain  Moderate to severe headaches: one hours or longer.  Uses THC to abort the headache.   Neck pain:there all the time    How often do you take abortive medication for headache in a week?  Over-the-counter medication:none-states cannot tolerate most medication  Migraine specific medication: none    What time of the day do symptoms start?  Mild headaches: in am 4/10 and as the day Progresses gets better  Moderate to severe headaches: any trigger will bring it on  Neck pain: there all the time    Describe your usual symptoms?   Mild headaches: Dull, nagging, pressure, tight band  Moderate to severe headaches: Throbbing  Neck pain: soreness, stiffness    Where is your symptoms located?   Mild headaches: Temples right sides over the ear and frontal, may be at time on the left  Moderate to severe headaches: same as above  Neck pain: base of neck and , at the head of the SCM - bilaterally    Aura/Warning/Prodrome?   Aura onset at age:high school    Flashing lights -reports having bright quick flashing of light and has bilateral peripheral vision lasting seconds.  Zigzag lines that are bright and foggy with a reddish hue to them.    Associated symptoms with headache or neck pain:   - Decrease appetite, Nausea, Vomiting- with severe headaches, Diarrhea - with severe headaches  - Photophobia, Phonophobia, Osmophobia   - Lacrimation,   - Insomnia   - Pale   - Stiff or sore neck  - Light headed  - Off balance   - Problems with concentration   - Tinnitus - Separate entity but can occur with headaches  - Better with lying down   - Prefer to be in a cool, quiet, dark room     Headache triggers: Stress, tension, weather change, lack of sleep, dehydration    Sleep Habit:   How many hours do you actually sleep?  8+ hours  Do you snore and or have been  told that you stop breathing during sleeping?  No, did have history of sleep apnea status post surgery.   Do you grind/clench your teeth at night?  Yes  Do you have jaw pain?  Yes    Previous/current treatments for headaches/pain/mood:   CBD or THC: YES CBD and THC  Interventional procedure: NO   Alternative therapies: Yoga  Headache devices: Nerivio , Cefaly, Gamma cor, Tens units none  Trigger point injection/Nerve block:NO   Botulinum toxin: NO   Headache infusions:NO   Preventive medication:   - Magnesium sulfate 1 g (4/9/2020)  - Wellbutrin, Zoloft,  -Ambien   - Gabapentin 300 mg tid (overall made symptoms worse)  - Lisinopril 10 mg  - Tizanidine 2 mg tablet (ineffective),  -Seroquel (sleep and mood, wt gain),  Abortive medication:   - Morphine 4 mg / 2 mg, tramadol (ineffective)  - Imitrex  - Tylenol 650 mg  - Ibuprofen, aspirin,  - Zofran 4 mg injection/ODT,    - I personally reviewed old notes over the last few months     Imaging/work-up:      Lab Results   Component Value Date    GLUCOSE 90 04/11/2020     04/11/2020    K 3.6 04/11/2020    CO2 23 04/11/2020     04/11/2020    BUN 8 04/11/2020    EGFR >60.0 04/11/2020    CREATININE 0.9 04/11/2020    ALT 13 (L) 04/09/2020    TSH 2.860 10/05/2023       Lab Results   Component Value Date    TSH 2.860 10/05/2023       Lab Results   Component Value Date    GLUCOSE 90 04/11/2020    BUN 8 04/11/2020    CREATININE 0.9 04/11/2020    EGFR >60.0 04/11/2020     04/11/2020    K 3.6 04/11/2020     04/11/2020    CO2 23 04/11/2020    PROTEIN 7.3 04/09/2020    ALBUMIN 4.5 04/09/2020    BILITOT 0.9 04/09/2020    ALKPHOS 44 04/09/2020    AST 17 04/09/2020    ALT 13 (L) 04/09/2020       Lab Results   Component Value Date     04/11/2020    K 3.6 04/11/2020     04/11/2020    CO2 23 04/11/2020    BUN 8 04/11/2020    CREATININE 0.9 04/11/2020    GLUCOSE 90 04/11/2020    CALCIUM 8.9 04/11/2020    EGFR >60.0 04/11/2020    ANIONGAP 7 04/11/2020        Medications:   Current Outpatient Medications:     coenzyme Q10 (CO Q-10) 100 mg capsule, 1 in a.m. daily, Disp: 90 capsule, Rfl: 3    ergocalciferol (VITAMIN D2) 50,000 unit(1250 mcg) capsule, Take 1 capsule (50,000 Units total) by mouth once a week., Disp: 4 capsule, Rfl: 6    magnesium oxide (MAG-OX) 400 mg (241.3 mg magnesium) tablet, Half a tab in a.m. for 1 week then 1 tab in a.m. after that, Disp: 60 tablet, Rfl: 3    riboflavin, vitamin B2, (VITAMIN B-2) 100 mg tablet, 1 in a.m. for 1 week then 2 in a.m. after that, Disp: 180 tablet, Rfl: 3    tiZANidine (ZANAFLEX) 2 mg tablet, Take 2 mg by mouth every 6 (six) hours as needed for muscle spasms., Disp: , Rfl:     Past Medical History:  has a past medical history of GERD (gastroesophageal reflux disease), Hypertension, Lyme disease, and Migraine.    Past Surgical History:  has a past surgical history that includes Hernia repair; Fem-Pop Bypass; Shoulder surgery; Tonsillectomy; Adenoidectomy; and Appendectomy.    Social History:   Social History     Tobacco Use    Smoking status: Some Days     Types: Cigarettes    Smokeless tobacco: Never    Tobacco comments:     Quit 1 year ago    Vaping Use    Vaping Use: Never used   Substance Use Topics    Alcohol use: Yes     Comment: Very rare    Drug use: Yes     Types: Marijuana     Comment: last smoke yesterday abd uses CBD       Family History: family history includes Diverticulitis in an other family member; Hypertension in an other family member; No Known Problems in his biological brother and biological mother.    Allergies: is allergic to almond, egg white, milk containing products (dairy), tree nuts, and benadryl [diphenhydramine hcl].     Review of Systems  All other systems reviewed and negative except as noted in the HPI.      The following have been reviewed and updated as appropriate in this visit:   Allergies  Meds  Problems         Objective   Physical Exam:    Visit Vitals  /82  "(BP Location: Left upper arm, Patient Position: Sitting)   Pulse 85   Temp 36.6 °C (97.9 °F)   Resp 16   Ht 1.905 m (6' 3\")   Wt 93.9 kg (207 lb)   SpO2 99%   BMI 25.87 kg/m²         Musculoskeletal: - Range of motion -slightly limited to rotation     Behavior/Emotional: Appropriate, cooperative     Neurologic Exam:  Alert and oriented.       CN: Intact    Motor: Moving all extremities without any difficulty.    Sensory examination: Normal to light touch     Coordination: No tremor noted, finger-to-nose intact    Reflexes: 2/4 upper extremity, 3 out of 4 lower extremity     Gait: Was narrow based.  Positive Romberg, Bushra I. Malik, MD    I spent 70 minutes on this date of service performing the following activities: obtaining history, performing examination, entering orders, documenting, preparing for visit, obtaining / reviewing records, providing counseling and education and independently reviewing study/studies.   "

## 2023-10-12 NOTE — TELEPHONE ENCOUNTER
rn called pt, no answer and unable to leave voicemail as mailbox is not set up yet. rn called alternative contact in [place],  and left voicemail for pt to call our office back.

## 2023-10-13 ENCOUNTER — OFFICE VISIT (OUTPATIENT)
Dept: NEUROLOGY | Facility: CLINIC | Age: 38
End: 2023-10-13
Payer: COMMERCIAL

## 2023-10-13 VITALS
BODY MASS INDEX: 25.74 KG/M2 | OXYGEN SATURATION: 99 % | TEMPERATURE: 97.9 F | HEART RATE: 85 BPM | WEIGHT: 207 LBS | HEIGHT: 75 IN | RESPIRATION RATE: 16 BRPM | DIASTOLIC BLOOD PRESSURE: 82 MMHG | SYSTOLIC BLOOD PRESSURE: 140 MMHG

## 2023-10-13 DIAGNOSIS — E55.9 VITAMIN D DEFICIENCY: ICD-10-CM

## 2023-10-13 DIAGNOSIS — R41.89 COGNITIVE IMPAIRMENT: ICD-10-CM

## 2023-10-13 DIAGNOSIS — G43.109 MIGRAINE WITH AURA AND WITHOUT STATUS MIGRAINOSUS, NOT INTRACTABLE: ICD-10-CM

## 2023-10-13 DIAGNOSIS — M54.2 CERVICALGIA: ICD-10-CM

## 2023-10-13 DIAGNOSIS — F41.9 ANXIETY AND DEPRESSION: ICD-10-CM

## 2023-10-13 DIAGNOSIS — R51.9 CHRONIC DAILY HEADACHE: Primary | ICD-10-CM

## 2023-10-13 DIAGNOSIS — F45.8 BRUXISM: ICD-10-CM

## 2023-10-13 DIAGNOSIS — F32.A ANXIETY AND DEPRESSION: ICD-10-CM

## 2023-10-13 DIAGNOSIS — E53.8 VITAMIN B12 DEFICIENCY: ICD-10-CM

## 2023-10-13 DIAGNOSIS — R79.89 LOW TESTOSTERONE IN MALE: Primary | ICD-10-CM

## 2023-10-13 PROCEDURE — 3079F DIAST BP 80-89 MM HG: CPT | Performed by: PSYCHIATRY & NEUROLOGY

## 2023-10-13 PROCEDURE — 3077F SYST BP >= 140 MM HG: CPT | Performed by: PSYCHIATRY & NEUROLOGY

## 2023-10-13 PROCEDURE — 3008F BODY MASS INDEX DOCD: CPT | Performed by: PSYCHIATRY & NEUROLOGY

## 2023-10-13 PROCEDURE — 99205 OFFICE O/P NEW HI 60 MIN: CPT | Performed by: PSYCHIATRY & NEUROLOGY

## 2023-10-13 RX ORDER — LANOLIN ALCOHOL/MO/W.PET/CERES
CREAM (GRAM) TOPICAL
Qty: 60 TABLET | Refills: 3 | Status: SHIPPED | OUTPATIENT
Start: 2023-10-13 | End: 2024-02-01

## 2023-10-13 RX ORDER — ERGOCALCIFEROL 1.25 MG/1
50000 CAPSULE ORAL WEEKLY
Qty: 4 CAPSULE | Refills: 6 | Status: SHIPPED | OUTPATIENT
Start: 2023-10-13 | End: 2024-10-12

## 2023-10-13 RX ORDER — UBIDECARENONE 100 MG
CAPSULE ORAL
Qty: 90 CAPSULE | Refills: 3 | Status: SHIPPED | OUTPATIENT
Start: 2023-10-13 | End: 2024-02-01

## 2023-10-13 RX ORDER — RIBOFLAVIN (VITAMIN B2) 100 MG
TABLET ORAL
Qty: 180 TABLET | Refills: 3 | Status: SHIPPED | OUTPATIENT
Start: 2023-10-13 | End: 2024-02-01

## 2023-10-13 NOTE — TELEPHONE ENCOUNTER
rn called pt.  rn reviewed dr mac's lab results. Pt stated he saw neurology today and said they ordered an MRI of the brain as well as vitamin D weekly.  Labs ordered to [facility].  Pt to have them done before he returns in March for visit.

## 2023-10-17 ENCOUNTER — TELEPHONE (OUTPATIENT)
Dept: SCHEDULING | Age: 38
End: 2023-10-17
Payer: COMMERCIAL

## 2023-10-17 NOTE — TELEPHONE ENCOUNTER
After submission of a pre-certification to [facility] , it has been denied and requiring Peer Review .    Ordering Provider: DR. LEIGHTON SILVA    Test ordered: MRI BRAIN    Date ordered: 10/13/23    Contact information for Peer Review [phone]    Reference Number: [id number]    Reason for denial:               Once Peer Review complete, please respond with auth number if applicable.

## 2023-10-18 ENCOUNTER — TELEPHONE (OUTPATIENT)
Dept: NEUROLOGY | Facility: CLINIC | Age: 38
End: 2023-10-18
Payer: COMMERCIAL

## 2023-10-18 NOTE — TELEPHONE ENCOUNTER
Called patient and let him know that his MRI has been approved and provided him with PA number and central scheduling number

## 2023-10-18 NOTE — TELEPHONE ENCOUNTER
CARLEENP completed - [facility] approved - through December 12.2023   # [id number] [id number]

## 2023-10-18 NOTE — TELEPHONE ENCOUNTER
Called patient and provided him with authorization number and phone number to call to get scheduled for MRI

## 2023-10-26 ENCOUNTER — HOSPITAL ENCOUNTER (OUTPATIENT)
Dept: RADIOLOGY | Facility: HOSPITAL | Age: 38
Discharge: HOME | End: 2023-10-26
Attending: PSYCHIATRY & NEUROLOGY
Payer: COMMERCIAL

## 2023-10-26 DIAGNOSIS — G43.109 MIGRAINE WITH AURA AND WITHOUT STATUS MIGRAINOSUS, NOT INTRACTABLE: ICD-10-CM

## 2023-10-26 DIAGNOSIS — R51.9 CHRONIC DAILY HEADACHE: ICD-10-CM

## 2023-10-26 DIAGNOSIS — R41.89 COGNITIVE IMPAIRMENT: ICD-10-CM

## 2023-10-26 RX ORDER — GADOBUTROL 604.72 MG/ML
9.3 INJECTION INTRAVENOUS ONCE
Status: COMPLETED | OUTPATIENT
Start: 2023-10-26 | End: 2023-10-26

## 2023-10-26 RX ORDER — GADOBUTROL 604.72 MG/ML
9.3 INJECTION INTRAVENOUS ONCE
Status: DISCONTINUED | OUTPATIENT
Start: 2023-10-26 | End: 2023-10-26

## 2023-10-26 RX ADMIN — GADOBUTROL 9.3 ML: 604.72 INJECTION INTRAVENOUS at 11:48

## 2023-11-02 LAB
BUN SERPL-MCNC: 19 MG/DL (ref 6–20)
CREAT SERPL-MCNC: 1.11 MG/DL (ref 0.76–1.27)
EGFRCR SERPLBLD CKD-EPI 2021: 87 ML/MIN/1.73
VIT B12 SERPL-MCNC: 309 PG/ML (ref 232–1245)

## 2024-02-01 ENCOUNTER — OFFICE VISIT (OUTPATIENT)
Dept: NEUROLOGY | Facility: CLINIC | Age: 39
End: 2024-02-01
Payer: COMMERCIAL

## 2024-02-01 VITALS
DIASTOLIC BLOOD PRESSURE: 70 MMHG | WEIGHT: 216 LBS | TEMPERATURE: 97.6 F | BODY MASS INDEX: 26.86 KG/M2 | HEART RATE: 72 BPM | HEIGHT: 75 IN | SYSTOLIC BLOOD PRESSURE: 104 MMHG | RESPIRATION RATE: 16 BRPM | OXYGEN SATURATION: 99 %

## 2024-02-01 DIAGNOSIS — R51.9 CHRONIC DAILY HEADACHE: Primary | ICD-10-CM

## 2024-02-01 DIAGNOSIS — R41.89 COGNITIVE IMPAIRMENT: ICD-10-CM

## 2024-02-01 DIAGNOSIS — G43.109 MIGRAINE WITH AURA AND WITHOUT STATUS MIGRAINOSUS, NOT INTRACTABLE: ICD-10-CM

## 2024-02-01 DIAGNOSIS — M54.2 CERVICALGIA: ICD-10-CM

## 2024-02-01 PROCEDURE — 3074F SYST BP LT 130 MM HG: CPT | Performed by: PSYCHIATRY & NEUROLOGY

## 2024-02-01 PROCEDURE — 99215 OFFICE O/P EST HI 40 MIN: CPT | Performed by: PSYCHIATRY & NEUROLOGY

## 2024-02-01 PROCEDURE — 3008F BODY MASS INDEX DOCD: CPT | Performed by: PSYCHIATRY & NEUROLOGY

## 2024-02-01 PROCEDURE — 3078F DIAST BP <80 MM HG: CPT | Performed by: PSYCHIATRY & NEUROLOGY

## 2024-02-01 RX ORDER — UBIDECARENONE 100 MG
CAPSULE ORAL
Qty: 90 CAPSULE | Refills: 3 | Status: SHIPPED | OUTPATIENT
Start: 2024-02-01

## 2024-02-01 RX ORDER — LANOLIN ALCOHOL/MO/W.PET/CERES
CREAM (GRAM) TOPICAL
Qty: 90 TABLET | Refills: 3 | Status: SHIPPED | OUTPATIENT
Start: 2024-02-01

## 2024-02-01 RX ORDER — RIBOFLAVIN (VITAMIN B2) 100 MG
TABLET ORAL
Qty: 180 TABLET | Refills: 3 | Status: SHIPPED | OUTPATIENT
Start: 2024-02-01

## 2024-02-01 RX ORDER — CHOLECALCIFEROL (VITAMIN D3) 50 MCG
TABLET ORAL
Qty: 180 TABLET | Refills: 3 | Status: SHIPPED | OUTPATIENT
Start: 2024-02-01

## 2024-02-01 RX ORDER — VIT C/E/ZN/COPPR/LUTEIN/ZEAXAN 250MG-90MG
500 CAPSULE ORAL DAILY
Qty: 90 TABLET | Refills: 3 | Status: SHIPPED | OUTPATIENT
Start: 2024-02-01 | End: 2025-01-31

## 2024-02-01 NOTE — PROGRESS NOTES
[facility]   Headache Center  Rosi Bedoya MD  [address] ([address])  [place] [address]       Patient ID: En Gonsales    : 1985  MRN: [id number]                                            Visit Date: 2024  Encounter Provider: Rosi Bedoya  Referring Provider: No ref. provider found           Assessment/Plan   Problem List Items Addressed This Visit        Nervous    Cervicalgia    Chronic daily headache - Primary    Relevant Medications    magnesium oxide (MAG-OX) 400 mg (241.3 mg magnesium) tablet    riboflavin, vitamin B2, (VITAMIN B-2) 100 mg tablet    coenzyme Q10 (CO Q-10) 100 mg capsule    cyanocobalamin (vitamin B-12) 500 mcg tablet    cholecalciferol, vitamin D3, (VITAMIN D3) 50 mcg (2,000 unit) tablet       Other    Cognitive impairment    Relevant Orders    Ambulatory referral to Psychology    Migraine with aura and without status migrainosus, not intractable    Relevant Medications    magnesium oxide (MAG-OX) 400 mg (241.3 mg magnesium) tablet    riboflavin, vitamin B2, (VITAMIN B-2) 100 mg tablet    coenzyme Q10 (CO Q-10) 100 mg capsule    cyanocobalamin (vitamin B-12) 500 mcg tablet    cholecalciferol, vitamin D3, (VITAMIN D3) 50 mcg (2,000 unit) tablet       Please message us via My Chart with any questions or concerns.    Cognitive concerns: (Question possible ADD)  - Refer patient to psychology for evaluation.  (Again given list of psychologist to call)  Vitamin D deficiency: 4000 international units daily (prescription sent to the pharmacy)  10/5/2023-18.2  Low normal B12: Take B12 500 mcg daily (prescription sent to the pharmacy)  2023: 309    Depression anxiety:  - We will refer to therapy.  Again given list of therapist and psychiatrist given to patient      Chronic daily headache:  Migraine headache with and without aura:    Preventive therapy for headaches:     -Using CBD THC daily once a day now per patient.    Never took  magnesium vitamin B2 CoQ 10.  States was not sure that he had to take them.  Did not know that they were sent to the pharmacy.  Again reviewed with patient that I will send all the prescription to the pharmacy.    Magnesium Oxide 200 mg at bedtime for 1 week then 400 mg at bedtime after that.  If any diarrhea or upset stomach, decrease dose as tolerated (prescription sent to the pharmacy)    - Vitamin B 2 (riboflavin) 100 mg in a.m. for 1 week then 200 mg in a.m. after that. Two minor adverse reactions reported: diarrhea and increased urine frequency.  May cause the urine to turn yellow which is normal for B 2 to do and is not a sign that you are dehydrated. May order on line, as it comes in 400 mg capsules.  (Prescription sent to the pharmacy)    - CoQ 100 mg 1 tab in a.m. daily (prescription sent to the pharmacy)    Abortive therapy for headaches:     -We will hold off on any abortive medication at this time.    -Consider Nerivio:       Cervicalgia (neck pain):  Bruxism:  - We will refer to physical therapy for evaluation and treatment  -Basic neck exercises given to patient to use on daily basis      Return in about 6 months (around 8/1/2024).         _________________________________________________________________      Chief Complaint: Follow-up      Subjective     We had the pleasure of evaluating En Gonsales in neurological follow-up today. As you know he  is a 38 y.o.  years old  right handed male.  he is here today for evaluation of migraine headaches.  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  Personal history: In relationship and has 2 step kids    Medical history review:   QTC: 4/8/2020-444  Elevated prolactin level: 5/17/2023 31.7  Iron deficiency anemia  History of Lyme disease  Long COVID  History of astigmatism  History of sleep apnea status post surgery  History of multiple head injuries when patient was younger.  Hypertension    Psychiatry history review:   Reports  increased stress.  States his father passed away last year which was very stressful for him.  Also multiple family members that are sick.  Patient also is looking for a job which has been stressful for him.  Depression: yes -but currently not seeing a psychiatrist.  States he does more meditation or yoga.  Did see a psychiatrist when he was younger.  Anxiety: yes  Seeing a psychiatrist: yes in the past  Seeing at therapist: yes in the past    Reports multiple other neurology concerns:  - States has problem with concentration.  At times stares out into space.  - Has problems with spelling and was told that he also may have ADD.   Does tend to be very restless.  But not officially diagnosed by a psychologist or a physician.  - Reports having tics in his upper extremity and a time in his lower extremity.  Worse over the last few months.  Symptoms initially started about 3 years ago.  -Numbness and tingling in his hands and feet bilaterally.  - Overall feels fatigued and at times feels that his upper extremities can become stiff and weak and has poor control in his upper extremity.    Headache/pain history:  Symptoms started at what age?  Started in his teens, in his 20's to 30's headaches were pretty intense.  Then they did improve until last few years.      Previous/current treatments for headaches/pain/mood:   CBD or THC: YES CBD and THC  Interventional procedure: NO   Alternative therapies: Yoga  Headache devices: Nerivio , Cefaly, Gamma cor, Tens units none  Trigger point injection/Nerve block:NO   Botulinum toxin: NO   Headache infusions:NO   Preventive medication:   - Magnesium sulfate 1 g (4/9/2020)  - Wellbutrin, Zoloft,  -Ambien   - Gabapentin 300 mg tid (overall made symptoms worse)  - Lisinopril 10 mg  - Tizanidine 2 mg tablet (ineffective),  -Seroquel (sleep and mood, wt gain),  Abortive medication:   - Morphine 4 mg / 2 mg, tramadol (ineffective)  - Imitrex  - Tylenol 650 mg  - Ibuprofen, aspirin,  -  Zofran 4 mg injection/ODT,      FOLLOW-UP CLINIC NOTE:  First date seen:  Last procedure date:      2/1/2024: Since last seen has noted improvement and has mild daily headaches that still occur daily but do not last long.  Migraine headache is still 1 week and neck pain has improved on its own with patient doing physical therapy.    Did not get an appointment with psychiatrist psychologist or therapist.  As patient states that all the numbers that he called did not take his insurance.  Will give him the numbers again today to call.      Reviewed lab work with patient.  Reviewed MRI of the brain on PACS today with patient.    What is your current pain level?   Headache:1/10  Neck pain:0/10    How often do the symptoms occur?   Mild headaches: Daily since high school  2/1/2024: daily    Moderate to severe headaches: once a week  2/1/2024: once a week    Neck pain/soreness: Daily - since high school  2/1/2024:  Once a week is doing PT on his own    What is the intensity of symptoms?   Mild headaches: 2 out of 10  2/1/2024: 2-3/10    Moderate to severe headaches: 5-7 out of 10  2/1/2024: 5/10    Neck pain: 4 out of 10  2/1/2024:2-3/10    How long do the symptoms last?   Mild headaches: baseline pain  2/1/2024:  Now they last about an hour or sometime all day    Moderate to severe headaches: one hours or longer.  Uses THC to abort the headache.   2/1/2024: half a day    Neck pain:there all the time  2/1/2024: all day    How often do you take abortive medication for headache in a week?  Over-the-counter medication:none-states cannot tolerate most medication  Migraine specific medication: none    What time of the day do symptoms start?  Mild headaches: in am 4/10 and as the day Progresses gets better  Moderate to severe headaches: any trigger will bring it on  Neck pain: there all the time    Describe your usual symptoms?   Mild headaches: Dull, nagging, pressure, tight band  Moderate to severe headaches: Throbbing  Neck  pain: soreness, stiffness    Where is your symptoms located?   Mild headaches: Temples right sides over the ear and frontal, may be at time on the left  Moderate to severe headaches: same as above  Primary stabbing headaches: now onset two months, right occipital.   Neck pain: base of neck and , at the head of the SCM - bilaterally    Aura/Warning/Prodrome?   Aura onset at age:high school    Flashing lights -reports having bright quick flashing of light and has bilateral peripheral vision lasting seconds.  Zigzag lines that are bright and foggy with a reddish hue to them.    Associated symptoms with headache or neck pain:   - Decrease appetite, Nausea, Vomiting- with severe headaches, Diarrhea - with severe headaches  - Photophobia, Phonophobia, Osmophobia   - Lacrimation,   - Insomnia   - Pale   - Stiff or sore neck  - Light headed  - Off balance   - Problems with concentration   - Tinnitus - Separate entity but can occur with headaches  - Better with lying down   - Prefer to be in a cool, quiet, dark room     Headache triggers: Stress, tension, weather change, lack of sleep, dehydration    - I personally reviewed old notes over the last few months     Imaging/work-up:    10/26/2023-MRI brain with and without contrast:  COMPARISON: None   COMMENT:  BRAIN PARENCHYMA: No acute infarct or hemorrhage. No mass effect or herniation.  Signal intensities are within normal limits for age.  No abnormal enhancement.   VENTRICLES/EXTRA-AXIAL SPACES: No hydrocephalus or extra-axial fluid  collections.  Asymmetric dilation of frontal horn of right lateral ventricle,  probable connatal cyst.   FLOW VOIDS: Intact.   EXTRACRANIAL STRUCTURES: Visualized structures are unremarkable.  IMPRESSION:  Unremarkable contrast-enhanced MRI brain.    Medications:   Current Outpatient Medications:   •  cholecalciferol, vitamin D3, (VITAMIN D3) 50 mcg (2,000 unit) tablet, 2 in a.m. daily, Disp: 180 tablet, Rfl: 3  •  coenzyme Q10 (CO Q-10) 100  "mg capsule, One three times a day, Disp: 90 capsule, Rfl: 3  •  cyanocobalamin (vitamin B-12) 500 mcg tablet, Take 1 tablet (500 mcg total) by mouth daily., Disp: 90 tablet, Rfl: 3  •  ergocalciferol (VITAMIN D2) 50,000 unit(1250 mcg) capsule, Take 1 capsule (50,000 Units total) by mouth once a week., Disp: 4 capsule, Rfl: 6  •  magnesium oxide (MAG-OX) 400 mg (241.3 mg magnesium) tablet, 1 at bedtime nightly, Disp: 90 tablet, Rfl: 3  •  riboflavin, vitamin B2, (VITAMIN B-2) 100 mg tablet, 2 tabs in a.m. daily, Disp: 180 tablet, Rfl: 3  •  tiZANidine (ZANAFLEX) 2 mg tablet, Take 2 mg by mouth every 6 (six) hours as needed for muscle spasms., Disp: , Rfl:       Review of Systems  All other systems reviewed and negative except as noted in the HPI.      The following have been reviewed and updated as appropriate in this visit:   Allergies  Meds  Problems         Objective   Physical Exam:    Visit Vitals  /70 (BP Location: Left upper arm, Patient Position: Sitting)   Pulse 72   Temp 36.4 °C (97.6 °F)   Resp 16   Ht 1.905 m (6' 3\")   Wt 98 kg (216 lb)   SpO2 99%   BMI 27.00 kg/m²         Musculoskeletal: - Range of motion -slightly limited to rotation     Behavior/Emotional: Appropriate, cooperative     Neurologic Exam:  Alert and oriented.       CN: Intact    Motor: Moving all extremities without any difficulty.    Sensory examination: Normal to light touch     Coordination: No tremor noted, finger-to-nose intact    Reflexes: 2/4 upper extremity, 3 out of 4 lower extremity     Gait: Was narrow based.  Positive Romberg, Bushra I. Malik, MD    I spent 40 minutes on this date of service performing the following activities: obtaining history, performing examination, entering orders, documenting, preparing for visit, obtaining / reviewing records, providing counseling and education and independently reviewing study/studies.   "

## 2024-02-01 NOTE — PATIENT INSTRUCTIONS
Please message us via My Chart with any questions or concerns.    Cognitive concerns: (Question possible ADD)  - Refer patient to psychology for evaluation.  (Again given list of psychologist to call)  Vitamin D deficiency: 4000 international units daily (prescription sent to the pharmacy)  10/5/2023-18.2  Low normal B12: Take B12 500 mcg daily (prescription sent to the pharmacy)  November 2023: 309    Depression anxiety:  - We will refer to therapy.  Again given list of therapist and psychiatrist given to patient      Chronic daily headache:  Migraine headache with and without aura:    Preventive therapy for headaches:     -Using CBD THC daily once a day    Magnesium Oxide 200 mg at bedtime for 1 week then 400 mg at bedtime after that.  If any diarrhea or upset stomach, decrease dose as tolerated (prescription sent to the pharmacy)    - Vitamin B 2 (riboflavin) 100 mg in a.m. for 1 week then 200 mg in a.m. after that. Two minor adverse reactions reported: diarrhea and increased urine frequency.  May cause the urine to turn yellow which is normal for B 2 to do and is not a sign that you are dehydrated. May order on line, as it comes in 400 mg capsules.  (Prescription sent to the pharmacy)    - CoQ 100 mg 1 tab in a.m. daily (prescription sent to the pharmacy)    Abortive therapy for headaches:     -We will hold off on any abortive medication at this time.    -Consider Nerivio:       Cervicalgia (neck pain):  Bruxism:  - We will refer to physical therapy for evaluation and treatment  -Basic neck exercises given to patient to use on daily basis    Cognitive evaluation:   Karishma Hernandez [facility]   Phone: [phone]   Location  [address] [place] [address]    Dr. Aranza Rico, PhD  Office:   [facility] on Fridays.  [address], [address], [place] [address] (except Fridays)  Mailing address: [address] [place] [address] [address], [place] [address]  Phone: [phone]  [email or web address]    Neuropsychological evaluation at [facility]  Office:  Aleksandar Barillas [facility]  [address] [place] [address]  Phone: [phone]     Flori Tejeda  Office:   [address]  [place] [address]  Phone: [phone]    Bettina Sanders PhD  Office:   [address] [facility], [address]  [place] [address]  Phone: [phone]    Teja Arguelles, PhD  Phone: [phone]  [email or web address]     -------------------------------------------------------------------------------------------------------------------------------  Mood disorder:   [facility] - Outpatient Psychiatry services:  Number: [phone]-extension 1 for out/inpt, scheduling and then extension 4 for out pt scheduling.    [facility]:   Address: [address] [place], [address]  Phone: [phone]    Below referrals for Psychiatry and counseling that work with Medicare:    [facility]  Has several locations in [place], [place] & [place] and offers virtual apts. **At least month wait**   Phone: [phone]   Website: [email or web address]     [facility]  Located in [place] and is offering telehealth only at this time (so you would not need to travel for apts)  Phone: [phone]   Website: [email or web address]     [facility]   Locations: [place], [place] & [place] plus virtual counseling.  Accepts most commercial & medicare  Phone: [phone]   Website: [email or web address]     [facility]   Located in [place], [place], offers telehealth & in person, is accepting new patients. Call or online intake forms to get started.  **Soon appointments**  Phone: [phone]    Website: [email or web address]

## 2024-03-08 ENCOUNTER — TELEPHONE (OUTPATIENT)
Dept: ENDOCRINOLOGY | Facility: CLINIC | Age: 39
End: 2024-03-08
Payer: COMMERCIAL

## 2024-03-08 NOTE — TELEPHONE ENCOUNTER
vm left for pt to call the office back to make f/u appt because 3/26 was cancelled due to provider not being in the office

## 2024-04-17 LAB
25(OH)D3+25(OH)D2 SERPL-MCNC: 35.4 NG/ML (ref 30–100)
PROLACTIN SERPL-MCNC: 11.8 NG/ML (ref 3.9–22.7)

## 2025-05-21 NOTE — PROGRESS NOTES
"Infectious Disease Progress Note    Patient Name: En Gonsales  MR#: [id number]  : 1985  Admission Date: 2020  Date: 04/10/20   Time: 9:47 AM   Author: Ciro De La Vega MD    Major Events: none    Antibiotics:        Subjective     Review of Systems  Feels well. Still has diarrhea but denies any worsening abdominal pain.    Objective     Vital Signs:    Patient Vitals for the past 72 hrs:   BP Temp Temp src Pulse Resp SpO2 Height Weight   04/10/20 0925 (!) 115/55 37.2 °C (98.9 °F) Temporal 77 18 98 % -- --   04/10/20 0830 139/85 36.9 °C (98.5 °F) Temporal 88 18 96 % -- --   04/10/20 0817 130/80 36.7 °C (98.1 °F) Oral 74 18 97 % -- --   04/10/20 0536 135/77 36.7 °C (98.1 °F) Oral 72 18 96 % -- 91.6 kg (202 lb)   04/10/20 0056 -- -- -- 75 -- -- -- --   20 2239 123/71 36.8 °C (98.3 °F) Oral 77 18 96 % -- --   20 1930 -- -- -- 84 -- -- -- --   20 1928 (!) 147/72 36.9 °C (98.4 °F) Oral 83 18 94 % -- --   20 1550 124/71 36.9 °C (98.4 °F) Temporal 79 18 96 % -- --   20 1108 129/71 37 °C (98.6 °F) Temporal 86 18 95 % -- --   20 0800 (!) 143/64 36.8 °C (98.2 °F) Temporal 77 18 97 % -- --   20 0524 (!) 159/78 36.4 °C (97.6 °F) Oral 86 18 98 % -- --   20 0500 133/66 -- -- 77 16 98 % -- --   20 0135 140/89 -- -- 98 18 97 % -- --   20 0045 120/74 -- -- 88 16 97 % -- --   20 2320 120/72 -- -- 90 16 97 % -- --   20 2221 (!) 159/87 -- -- 92 16 98 % -- --   207 136/89 36.6 °C (97.9 °F) Temporal -- 20 99 % 1.905 m (6' 3\") 97.5 kg (215 lb)       Temp (72hrs), Av.8 °C (98.3 °F), Min:36.4 °C (97.6 °F), Max:37.2 °C (98.9 °F)      Physical Exam:  General appearance: alert and cooperative  Head: normocephalic, without obvious abnormality, atraumatic  Lungs: clear to auscultation bilaterally  Heart: regular rate and rhythm  Abdomen: soft, non-tender  Extremities: edema none  Skin: no rashes  Neurologic: Grossly normal    Lines, Drains, " Airways, Wounds:  Peripheral IV 04/08/20 Right Arm (Active)   Number of days: 2       Peripheral IV 04/08/20 Left Forearm (Active)   Number of days: 2       Labs:    CBC Results       04/10/20 04/09/20 04/08/20                    0533 0520 2109         WBC 7.87 20.25 25.64         RBC 4.05 5.13 5.87         HGB 8.6 10.7 12.1         HCT 29.3 36.7 41.0         MCV 72.3 71.5 69.8         MCH 21.2 20.9 20.6         MCHC 29.4 29.2 29.5          555 599         Comment for HGB at 0533 on 04/10/20:  ALL RESULTS HAVE BEEN CHECKED      BMP Results       04/10/20 04/09/20 04/08/20                    0533 0520 2109          136 136         K 3.3 4.2 3.5         Cl 107 107 103         CO2 24 20 17         Glucose 87 118 144         BUN 8 15 19         Creatinine 1.0 0.9 1.1         Calcium 8.5 9.2 9.9         Anion Gap 6 9 16         EGFR >60.0 >60.0 >60.0         Comment for K at 2109 on 04/08/20:    Results obtained on plasma. Plasma Potassium values may be up to 0.4 mEQ/L less than serum values. The differences may be greater for patients with high platelet or white cell counts.      PT/PTT Results     No lab values to display.      UA Results       04/08/20 2312           Color Yellow           Clarity Clear           Glucose Negative           Bilirubin Negative           Ketones +1           Sp Grav <=1.005           Blood Negative           Ph 5.5           Protein Negative           Urobilinogen 0.2           Nitrite Negative           Leuk Est Negative           Comment for Ketones at 2312 on 04/08/20:    Free sulfhydryl drugs such as Mesna, Capoten, and Acetylcysteine (Mucomyst) may cause false positive ketonuria.    Comment for Blood at 2312 on 04/08/20:    The sensitivity of the occult blood test is equivalent to approximately 4 intact RBC/HPF.    Comment for Leuk Est at 2312 on 04/08/20:    Results can be falsely negative due to high specific gravity, some antibiotics, glucose  >3 g/dl, or WBC other than neutrophils.      Lactate Results       04/08/20 02/19/16 02/16/16                    2312 0935 1339         Lactate 1.5 1.0 1.5                       Microbiology Results     Procedure Component Value Units Date/Time    Stool culture Stool [[id number]]  (Normal) Collected:  04/09/20 0709    Specimen:  Stool Updated:  04/10/20 0628     Stool Culture Culture in progress    Clostridium difficile tox screen Stool [[id number]]  (Normal) Collected:  04/09/20 0709    Specimen:  Stool Updated:  04/09/20 1106     C difficile Toxin Screen Negative    SARS-CoV-2 (COVID-19), PCR Nasopharynx [[id number]]  (Normal) Collected:  04/08/20 2314    Specimen:  Nasopharyngeal Swab from Nasopharynx Updated:  04/09/20 0432     SARS-CoV-2 (COVID-19) Negative    Blood Culture Blood, Venous [[id number]] Collected:  04/08/20 2312    Specimen:  Blood, Venous Updated:  04/10/20 0400     Culture No growth at 18-24 hours    Blood Culture Blood, Venous [[id number]] Collected:  04/08/20 2312    Specimen:  Blood, Venous Updated:  04/10/20 0533     Culture No growth at 18-24 hours          Pathology Results     ** No results found for the last 720 hours. **          Echo:          Imaging:    Radiology Imaging   XR CHEST 1 VW    Narrative CLINICAL HISTORY:  cough/SOB    TECHNIQUE: Frontal chest radiograph.    COMPARISON: CT of the chest 2/19/2016    FINDINGS:    Lungs are clear.  There is no pneumothorax.  Cardiac silhouette within normal limits.  There is no vascular congestion.        Impression IMPRESSION: No acute pulmonary process.         Assessment   1. Leukocytosis - likely reactive and resolved off antibiotics. Low concern for infectious etiology given chronicity of abdominal pain and diarrhea. C diff screen is negative and pt remains afebrile. Plan for colonoscopy now to evaluate for IBD.     Plan         1. Continue to monitor off antibiotics with concern for infection being low.                     16

## (undated) DEVICE — FORCEP COLD RADIAL JAW